# Patient Record
Sex: MALE | Race: WHITE | NOT HISPANIC OR LATINO | Employment: OTHER | URBAN - METROPOLITAN AREA
[De-identification: names, ages, dates, MRNs, and addresses within clinical notes are randomized per-mention and may not be internally consistent; named-entity substitution may affect disease eponyms.]

---

## 2022-03-02 ENCOUNTER — APPOINTMENT (OUTPATIENT)
Dept: RADIOLOGY | Facility: MEDICAL CENTER | Age: 77
DRG: 419 | End: 2022-03-02
Attending: EMERGENCY MEDICINE
Payer: MEDICARE

## 2022-03-02 ENCOUNTER — APPOINTMENT (OUTPATIENT)
Dept: RADIOLOGY | Facility: MEDICAL CENTER | Age: 77
DRG: 419 | End: 2022-03-02
Attending: STUDENT IN AN ORGANIZED HEALTH CARE EDUCATION/TRAINING PROGRAM
Payer: MEDICARE

## 2022-03-02 ENCOUNTER — APPOINTMENT (OUTPATIENT)
Dept: CARDIOLOGY | Facility: MEDICAL CENTER | Age: 77
DRG: 419 | End: 2022-03-02
Attending: STUDENT IN AN ORGANIZED HEALTH CARE EDUCATION/TRAINING PROGRAM
Payer: MEDICARE

## 2022-03-02 ENCOUNTER — HOSPITAL ENCOUNTER (INPATIENT)
Facility: MEDICAL CENTER | Age: 77
LOS: 1 days | DRG: 419 | End: 2022-03-06
Attending: EMERGENCY MEDICINE | Admitting: STUDENT IN AN ORGANIZED HEALTH CARE EDUCATION/TRAINING PROGRAM
Payer: MEDICARE

## 2022-03-02 DIAGNOSIS — R07.89 CHEST PRESSURE: ICD-10-CM

## 2022-03-02 DIAGNOSIS — K80.20 SYMPTOMATIC CHOLELITHIASIS: ICD-10-CM

## 2022-03-02 DIAGNOSIS — I10 PRIMARY HYPERTENSION: ICD-10-CM

## 2022-03-02 DIAGNOSIS — R00.1 BRADYCARDIA: ICD-10-CM

## 2022-03-02 DIAGNOSIS — R10.13 EPIGASTRIC ABDOMINAL PAIN: ICD-10-CM

## 2022-03-02 DIAGNOSIS — K80.20 CALCULUS OF GALLBLADDER WITHOUT CHOLECYSTITIS WITHOUT OBSTRUCTION: ICD-10-CM

## 2022-03-02 DIAGNOSIS — R79.89 ELEVATED LFTS: ICD-10-CM

## 2022-03-02 PROBLEM — R07.9 CHEST PAIN: Status: ACTIVE | Noted: 2022-03-02

## 2022-03-02 PROBLEM — R74.01 TRANSAMINITIS: Status: ACTIVE | Noted: 2022-03-02

## 2022-03-02 LAB
ALBUMIN SERPL BCP-MCNC: 4.3 G/DL (ref 3.2–4.9)
ALBUMIN/GLOB SERPL: 1.7 G/DL
ALP SERPL-CCNC: 154 U/L (ref 30–99)
ALT SERPL-CCNC: 335 U/L (ref 2–50)
ANION GAP SERPL CALC-SCNC: 15 MMOL/L (ref 7–16)
AST SERPL-CCNC: 599 U/L (ref 12–45)
BASOPHILS # BLD AUTO: 0.5 % (ref 0–1.8)
BASOPHILS # BLD: 0.03 K/UL (ref 0–0.12)
BILIRUB SERPL-MCNC: 2.6 MG/DL (ref 0.1–1.5)
BUN SERPL-MCNC: 17 MG/DL (ref 8–22)
CALCIUM SERPL-MCNC: 9.2 MG/DL (ref 8.5–10.5)
CHLORIDE SERPL-SCNC: 105 MMOL/L (ref 96–112)
CO2 SERPL-SCNC: 21 MMOL/L (ref 20–33)
CREAT SERPL-MCNC: 0.62 MG/DL (ref 0.5–1.4)
EKG IMPRESSION: NORMAL
EOSINOPHIL # BLD AUTO: 0.02 K/UL (ref 0–0.51)
EOSINOPHIL NFR BLD: 0.3 % (ref 0–6.9)
ERYTHROCYTE [DISTWIDTH] IN BLOOD BY AUTOMATED COUNT: 38.2 FL (ref 35.9–50)
GLOBULIN SER CALC-MCNC: 2.5 G/DL (ref 1.9–3.5)
GLUCOSE SERPL-MCNC: 110 MG/DL (ref 65–99)
HCT VFR BLD AUTO: 38 % (ref 42–52)
HGB BLD-MCNC: 13.9 G/DL (ref 14–18)
IMM GRANULOCYTES # BLD AUTO: 0.02 K/UL (ref 0–0.11)
IMM GRANULOCYTES NFR BLD AUTO: 0.3 % (ref 0–0.9)
LV EJECT FRACT  99904: 60
LV EJECT FRACT MOD 2C 99903: 56.57
LV EJECT FRACT MOD 4C 99902: 69.45
LV EJECT FRACT MOD BP 99901: 66.41
LYMPHOCYTES # BLD AUTO: 1.19 K/UL (ref 1–4.8)
LYMPHOCYTES NFR BLD: 19 % (ref 22–41)
MAGNESIUM SERPL-MCNC: 2.1 MG/DL (ref 1.5–2.5)
MCH RBC QN AUTO: 32 PG (ref 27–33)
MCHC RBC AUTO-ENTMCNC: 36.6 G/DL (ref 33.7–35.3)
MCV RBC AUTO: 87.6 FL (ref 81.4–97.8)
MONOCYTES # BLD AUTO: 0.53 K/UL (ref 0–0.85)
MONOCYTES NFR BLD AUTO: 8.5 % (ref 0–13.4)
NEUTROPHILS # BLD AUTO: 4.47 K/UL (ref 1.82–7.42)
NEUTROPHILS NFR BLD: 71.4 % (ref 44–72)
NRBC # BLD AUTO: 0 K/UL
NRBC BLD-RTO: 0 /100 WBC
PLATELET # BLD AUTO: 143 K/UL (ref 164–446)
PMV BLD AUTO: 11 FL (ref 9–12.9)
POTASSIUM SERPL-SCNC: 3.7 MMOL/L (ref 3.6–5.5)
PROT SERPL-MCNC: 6.8 G/DL (ref 6–8.2)
RBC # BLD AUTO: 4.34 M/UL (ref 4.7–6.1)
SODIUM SERPL-SCNC: 141 MMOL/L (ref 135–145)
TROPONIN T SERPL-MCNC: 8 NG/L (ref 6–19)
TROPONIN T SERPL-MCNC: 8 NG/L (ref 6–19)
TSH SERPL DL<=0.005 MIU/L-ACNC: 1.15 UIU/ML (ref 0.38–5.33)
WBC # BLD AUTO: 6.3 K/UL (ref 4.8–10.8)

## 2022-03-02 PROCEDURE — 93005 ELECTROCARDIOGRAM TRACING: CPT | Performed by: EMERGENCY MEDICINE

## 2022-03-02 PROCEDURE — 85025 COMPLETE CBC W/AUTO DIFF WBC: CPT

## 2022-03-02 PROCEDURE — 700102 HCHG RX REV CODE 250 W/ 637 OVERRIDE(OP): Performed by: INTERNAL MEDICINE

## 2022-03-02 PROCEDURE — 76705 ECHO EXAM OF ABDOMEN: CPT

## 2022-03-02 PROCEDURE — 96374 THER/PROPH/DIAG INJ IV PUSH: CPT

## 2022-03-02 PROCEDURE — 84484 ASSAY OF TROPONIN QUANT: CPT

## 2022-03-02 PROCEDURE — 80053 COMPREHEN METABOLIC PANEL: CPT

## 2022-03-02 PROCEDURE — G0378 HOSPITAL OBSERVATION PER HR: HCPCS

## 2022-03-02 PROCEDURE — A9270 NON-COVERED ITEM OR SERVICE: HCPCS | Performed by: STUDENT IN AN ORGANIZED HEALTH CARE EDUCATION/TRAINING PROGRAM

## 2022-03-02 PROCEDURE — 96375 TX/PRO/DX INJ NEW DRUG ADDON: CPT

## 2022-03-02 PROCEDURE — 700102 HCHG RX REV CODE 250 W/ 637 OVERRIDE(OP): Performed by: STUDENT IN AN ORGANIZED HEALTH CARE EDUCATION/TRAINING PROGRAM

## 2022-03-02 PROCEDURE — 99285 EMERGENCY DEPT VISIT HI MDM: CPT

## 2022-03-02 PROCEDURE — 94760 N-INVAS EAR/PLS OXIMETRY 1: CPT

## 2022-03-02 PROCEDURE — 71045 X-RAY EXAM CHEST 1 VIEW: CPT

## 2022-03-02 PROCEDURE — 36415 COLL VENOUS BLD VENIPUNCTURE: CPT

## 2022-03-02 PROCEDURE — 93306 TTE W/DOPPLER COMPLETE: CPT

## 2022-03-02 PROCEDURE — 84443 ASSAY THYROID STIM HORMONE: CPT

## 2022-03-02 PROCEDURE — 83735 ASSAY OF MAGNESIUM: CPT

## 2022-03-02 PROCEDURE — 93005 ELECTROCARDIOGRAM TRACING: CPT

## 2022-03-02 PROCEDURE — A9270 NON-COVERED ITEM OR SERVICE: HCPCS | Performed by: HOSPITALIST

## 2022-03-02 PROCEDURE — 700102 HCHG RX REV CODE 250 W/ 637 OVERRIDE(OP): Performed by: HOSPITALIST

## 2022-03-02 PROCEDURE — 700111 HCHG RX REV CODE 636 W/ 250 OVERRIDE (IP): Performed by: EMERGENCY MEDICINE

## 2022-03-02 PROCEDURE — 700105 HCHG RX REV CODE 258: Performed by: EMERGENCY MEDICINE

## 2022-03-02 PROCEDURE — 96372 THER/PROPH/DIAG INJ SC/IM: CPT

## 2022-03-02 PROCEDURE — 99220 PR INITIAL OBSERVATION CARE,LEVL III: CPT | Performed by: STUDENT IN AN ORGANIZED HEALTH CARE EDUCATION/TRAINING PROGRAM

## 2022-03-02 PROCEDURE — A9270 NON-COVERED ITEM OR SERVICE: HCPCS | Performed by: INTERNAL MEDICINE

## 2022-03-02 PROCEDURE — 700111 HCHG RX REV CODE 636 W/ 250 OVERRIDE (IP): Performed by: STUDENT IN AN ORGANIZED HEALTH CARE EDUCATION/TRAINING PROGRAM

## 2022-03-02 PROCEDURE — 93306 TTE W/DOPPLER COMPLETE: CPT | Mod: 26 | Performed by: INTERNAL MEDICINE

## 2022-03-02 RX ORDER — MORPHINE SULFATE 4 MG/ML
2 INJECTION INTRAVENOUS ONCE
Status: COMPLETED | OUTPATIENT
Start: 2022-03-02 | End: 2022-03-02

## 2022-03-02 RX ORDER — TAMSULOSIN HYDROCHLORIDE 0.4 MG/1
0.4 CAPSULE ORAL DAILY
COMMUNITY
Start: 2022-01-10 | End: 2022-07-09

## 2022-03-02 RX ORDER — SIMVASTATIN 40 MG
40 TABLET ORAL NIGHTLY
Status: DISCONTINUED | OUTPATIENT
Start: 2022-03-02 | End: 2022-03-02

## 2022-03-02 RX ORDER — HYDRALAZINE HYDROCHLORIDE 20 MG/ML
10 INJECTION INTRAMUSCULAR; INTRAVENOUS EVERY 6 HOURS PRN
Status: DISCONTINUED | OUTPATIENT
Start: 2022-03-02 | End: 2022-03-06 | Stop reason: HOSPADM

## 2022-03-02 RX ORDER — ACETAMINOPHEN 500 MG
500-1000 TABLET ORAL EVERY 6 HOURS PRN
Status: ON HOLD | COMMUNITY
End: 2022-03-06

## 2022-03-02 RX ORDER — LISINOPRIL 10 MG/1
1 TABLET ORAL DAILY
COMMUNITY
Start: 2022-01-10 | End: 2022-07-09

## 2022-03-02 RX ORDER — SODIUM CHLORIDE 9 MG/ML
INJECTION, SOLUTION INTRAVENOUS CONTINUOUS
Status: DISCONTINUED | OUTPATIENT
Start: 2022-03-02 | End: 2022-03-06 | Stop reason: HOSPADM

## 2022-03-02 RX ORDER — TAMSULOSIN HYDROCHLORIDE 0.4 MG/1
CAPSULE ORAL
Status: ACTIVE
Start: 2022-03-02 | End: 2022-03-03

## 2022-03-02 RX ORDER — ONDANSETRON 2 MG/ML
4 INJECTION INTRAMUSCULAR; INTRAVENOUS ONCE
Status: COMPLETED | OUTPATIENT
Start: 2022-03-02 | End: 2022-03-02

## 2022-03-02 RX ORDER — TAMSULOSIN HYDROCHLORIDE 0.4 MG/1
0.4 CAPSULE ORAL DAILY
Status: DISCONTINUED | OUTPATIENT
Start: 2022-03-02 | End: 2022-03-06 | Stop reason: HOSPADM

## 2022-03-02 RX ORDER — OXYCODONE HYDROCHLORIDE 5 MG/1
5 TABLET ORAL EVERY 4 HOURS PRN
Status: DISCONTINUED | OUTPATIENT
Start: 2022-03-02 | End: 2022-03-06 | Stop reason: HOSPADM

## 2022-03-02 RX ORDER — M-VIT,TX,IRON,MINS/CALC/FOLIC 27MG-0.4MG
1 TABLET ORAL DAILY
COMMUNITY

## 2022-03-02 RX ORDER — LISINOPRIL 10 MG/1
10 TABLET ORAL EVERY EVENING
Status: DISCONTINUED | OUTPATIENT
Start: 2022-03-02 | End: 2022-03-06 | Stop reason: HOSPADM

## 2022-03-02 RX ORDER — FEXOFENADINE HCL 60 MG/1
60 TABLET, FILM COATED ORAL DAILY
Status: ON HOLD | COMMUNITY
End: 2022-03-06

## 2022-03-02 RX ORDER — ACETAMINOPHEN 650 MG/1
650 SUPPOSITORY RECTAL EVERY 4 HOURS PRN
Status: DISCONTINUED | OUTPATIENT
Start: 2022-03-02 | End: 2022-03-06 | Stop reason: HOSPADM

## 2022-03-02 RX ORDER — ASPIRIN 81 MG/1
81 TABLET ORAL DAILY
COMMUNITY

## 2022-03-02 RX ORDER — ACETAMINOPHEN 325 MG/1
650 TABLET ORAL EVERY 4 HOURS PRN
Status: DISCONTINUED | OUTPATIENT
Start: 2022-03-02 | End: 2022-03-06 | Stop reason: HOSPADM

## 2022-03-02 RX ORDER — SIMVASTATIN 40 MG
40 TABLET ORAL NIGHTLY
COMMUNITY

## 2022-03-02 RX ADMIN — ENOXAPARIN SODIUM 40 MG: 40 INJECTION SUBCUTANEOUS at 06:36

## 2022-03-02 RX ADMIN — MORPHINE SULFATE 2 MG: 4 INJECTION INTRAVENOUS at 02:16

## 2022-03-02 RX ADMIN — SODIUM CHLORIDE: 9 INJECTION, SOLUTION INTRAVENOUS at 02:16

## 2022-03-02 RX ADMIN — TAMSULOSIN HYDROCHLORIDE 0.4 MG: 0.4 CAPSULE ORAL at 17:04

## 2022-03-02 RX ADMIN — ONDANSETRON 4 MG: 2 INJECTION INTRAMUSCULAR; INTRAVENOUS at 02:16

## 2022-03-02 RX ADMIN — ACETAMINOPHEN 650 MG: 325 TABLET, FILM COATED ORAL at 17:05

## 2022-03-02 RX ADMIN — SODIUM CHLORIDE: 9 INJECTION, SOLUTION INTRAVENOUS at 19:28

## 2022-03-02 RX ADMIN — ASPIRIN 81 MG: 81 TABLET, COATED ORAL at 17:05

## 2022-03-02 RX ADMIN — SODIUM CHLORIDE: 9 INJECTION, SOLUTION INTRAVENOUS at 11:17

## 2022-03-02 RX ADMIN — LISINOPRIL 10 MG: 10 TABLET ORAL at 19:25

## 2022-03-02 ASSESSMENT — ENCOUNTER SYMPTOMS
FEVER: 0
VOMITING: 0
DOUBLE VISION: 0
HEMOPTYSIS: 0
PALPITATIONS: 0
CHILLS: 0
BRUISES/BLEEDS EASILY: 0
NECK PAIN: 0
MYALGIAS: 0
HEADACHES: 0
BLURRED VISION: 0
HEARTBURN: 0
COUGH: 0
NAUSEA: 0
DEPRESSION: 0
ABDOMINAL PAIN: 1
TINGLING: 0
DIZZINESS: 0

## 2022-03-02 ASSESSMENT — PAIN DESCRIPTION - PAIN TYPE: TYPE: ACUTE PAIN

## 2022-03-02 NOTE — CARE PLAN
Patient arrives to walk in clinic with complaint of right shoulder pain that is exacerbated with movement. . Onset last night. Denies known injury but did carry firewood into house yesterday. Applied ice. Denies latex allergy. Allergies reviewed. PCP per banner. Medications verified and up to date. Rx up to date. The patient is Watcher - Medium risk of patient condition declining or worsening           Problem: Knowledge Deficit - Standard  Goal: Patient and family/care givers will demonstrate understanding of plan of care, disease process/condition, diagnostic tests and medications  Outcome: Progressing

## 2022-03-02 NOTE — ED TRIAGE NOTES
Ton Christine  77 y.o. male  Chief Complaint   Patient presents with   • Chest Pain     Chest pain started at 1500 yesterday. He took some tums because he thought he was having indigestion without relief. Describes pain as an uncomfortable pressure. +Minor abdominal pain.      Patient BIB EMS for the above. He has a similar pain over the weekend but thought it was indigestion and wasn't seen then. In triage, pt's BP on left: 135/71 and on right: 146/86. No cardiac history. PIV placed with EMS. EKG complete.     Vitals:    03/02/22 0105   BP: (!) 163/83   Pulse: 63   Resp: 19   Temp: 36.6 °C (97.9 °F)   SpO2: 94%       Triage process explained to patient, apologized for wait time, and returned to lobby.  Pt informed to notify staff of any change in condition.

## 2022-03-02 NOTE — ASSESSMENT & PLAN NOTE
Telemetry monitoring  TSH 1.150  Echo demonstrated normal left ventricular size, wall thickness, systolic, and diastolic function. EF 60%. Normal right ventricular size and systolic function. Normal left atrial size. Mild mitral annular calcification and calcification mitral apparatus.  Cardiology consulted, no further inpatient cardiac work-up is indicated at this time.

## 2022-03-02 NOTE — ED PROVIDER NOTES
ED Provider Note     Scribed for Danae Gautam D.O. by Gian De Anda. 3/2/2022, 1:41 AM.     Primary care provider: No primary care provider noted.  Means of arrival: EMS         History obtained from: Patient  History limited by: None    CHIEF COMPLAINT  Chief Complaint   Patient presents with   • Chest Pain     Chest pain started at 1500 yesterday. He took some tums because he thought he was having indigestion without relief. Describes pain as an uncomfortable pressure. +Minor abdominal pain.        HPI  Ton Christine is a 77 y.o. male who presents to the emergency Department via EMS for evaluation of acute chest pain. This started around 3 pm yesterday afternoon. He was in the recliner watching TV when he developed pressure in his chest. At the time he was mildly short of breath and diaphoretic. About a week ago he had fever, vomiting, and diarrhea but not over the last several days. He has not been tested for COVID.  He is fully vaccinated for Covid including booster.  He has a history of hypertension and TIA, and takes Lisinopril, 81 mg of ASA, and Flomax for an enlarged prostate.     REVIEW OF SYSTEMS  Pertinent positives include chest pain, shortness of breath, and diaphoresis. Pertinent negatives include no fever, vomiting, and diarrhea.   See HPI for further details. All other systems are negative.    PAST MEDICAL HISTORY  Past Medical History:   Diagnosis Date   • Hypertension    Benign prostatic hypertrophy    FAMILY HISTORY  History reviewed. No pertinent family history.    SOCIAL HISTORY  Social History     Tobacco Use   • Smoking status: Never Smoker   • Smokeless tobacco: Never Used   Vaping Use   • Vaping Use: Never used   Substance Use Topics   • Alcohol use: Not Currently   • Drug use: Never      Social History     Substance and Sexual Activity   Drug Use Never       SURGICAL HISTORY  History reviewed. No pertinent surgical history.    CURRENT MEDICATIONS  No current outpatient  "medications    ALLERGIES  Allergies   Allergen Reactions   • Ampicillin Unspecified     Childhood allergy/ unkn reaction       PHYSICAL EXAM  VITAL SIGNS: BP (!) 163/83   Pulse 63   Temp 36.6 °C (97.9 °F) (Temporal)   Resp 19   Ht 1.727 m (5' 8\")   Wt 71.7 kg (158 lb)   SpO2 94%   BMI 24.02 kg/m²     Constitutional: Patient is well developed, well nourished. Non-toxic appearing.  Mild distress.  HENT: Normocephalic, atraumatic.  Oral mucosa moist.  Eyes: PERRL, EOMI, Conjunctiva without erythema or exudates.   Cardiovascular: Normal heart rate and Regular rhythm. No murmur,  Thorax & Lungs: Clear and equal breath sounds with good excursion. No respiratory distress, no rhonchi, wheezing or rales.  No palpable chest wall tenderness.  Abdomen: Bowel sounds normal in all four quadrants. Soft,nontender, no rebound , guarding, palpable masses.   Skin: Warm, Dry, No erythema, No rashes.   Back: No cervical, thoracic, or lumbosacral tenderness. No CVA tenderness.   Extremities: Peripheral pulses 4/4 No edema, No tenderness  Musculoskeletal: Normal range of motion in all major joints. No tenderness to palpation or major deformities noted.   Neurologic: Alert & oriented x 3, Normal motor function, Normal sensory function  Psychiatric: Affect normal, Judgment normal, Mood normal.     DIAGNOSTICS/PROCEDURES    LABS  Results for orders placed or performed during the hospital encounter of 03/02/22   CBC with Differential   Result Value Ref Range    WBC 6.3 4.8 - 10.8 K/uL    RBC 4.34 (L) 4.70 - 6.10 M/uL    Hemoglobin 13.9 (L) 14.0 - 18.0 g/dL    Hematocrit 38.0 (L) 42.0 - 52.0 %    MCV 87.6 81.4 - 97.8 fL    MCH 32.0 27.0 - 33.0 pg    MCHC 36.6 (H) 33.7 - 35.3 g/dL    RDW 38.2 35.9 - 50.0 fL    Platelet Count 143 (L) 164 - 446 K/uL    MPV 11.0 9.0 - 12.9 fL    Neutrophils-Polys 71.40 44.00 - 72.00 %    Lymphocytes 19.00 (L) 22.00 - 41.00 %    Monocytes 8.50 0.00 - 13.40 %    Eosinophils 0.30 0.00 - 6.90 %    Basophils " 0.50 0.00 - 1.80 %    Immature Granulocytes 0.30 0.00 - 0.90 %    Nucleated RBC 0.00 /100 WBC    Neutrophils (Absolute) 4.47 1.82 - 7.42 K/uL    Lymphs (Absolute) 1.19 1.00 - 4.80 K/uL    Monos (Absolute) 0.53 0.00 - 0.85 K/uL    Eos (Absolute) 0.02 0.00 - 0.51 K/uL    Baso (Absolute) 0.03 0.00 - 0.12 K/uL    Immature Granulocytes (abs) 0.02 0.00 - 0.11 K/uL    NRBC (Absolute) 0.00 K/uL   Complete Metabolic Panel (CMP)   Result Value Ref Range    Sodium 141 135 - 145 mmol/L    Potassium 3.7 3.6 - 5.5 mmol/L    Chloride 105 96 - 112 mmol/L    Co2 21 20 - 33 mmol/L    Anion Gap 15.0 7.0 - 16.0    Glucose 110 (H) 65 - 99 mg/dL    Bun 17 8 - 22 mg/dL    Creatinine 0.62 0.50 - 1.40 mg/dL    Calcium 9.2 8.5 - 10.5 mg/dL    AST(SGOT) 599 (H) 12 - 45 U/L    ALT(SGPT) 335 (H) 2 - 50 U/L    Alkaline Phosphatase 154 (H) 30 - 99 U/L    Total Bilirubin 2.6 (H) 0.1 - 1.5 mg/dL    Albumin 4.3 3.2 - 4.9 g/dL    Total Protein 6.8 6.0 - 8.2 g/dL    Globulin 2.5 1.9 - 3.5 g/dL    A-G Ratio 1.7 g/dL   Troponin   Result Value Ref Range    Troponin T 8 6 - 19 ng/L   ESTIMATED GFR   Result Value Ref Range    GFR If African American >60 >60 mL/min/1.73 m 2    GFR If Non African American >60 >60 mL/min/1.73 m 2   EKG   Result Value Ref Range    Report       [facility] Emergency Dept.    Test Date:  2022  Pt Name:    MARTIR FAYE               Department: ER  MRN:        [id number]                      Room:  Gender:     Male                         Technician: [id number]  :        [id number]                   Requested By:ER TRIAGE PROTOCOL  Order #:    [id number]                    Reading MD:    Measurements  Intervals                                Axis  Rate:       55                           P:          56  MS:         152                          QRS:        13  QRSD:       90                           T:          54  QT:         440  QTc:        421    Interpretive Statements  SINUS BRADYCARDIA  No previous ECG  available for comparison       Labs reviewed by me    EKG  12 lead EKG interpreted by myself, see above.    RADIOLOGY/PROCEDURES  US-RUQ   Final Result      Cholelithiasis and sludge without positive sonographic Esparza's sign or gallbladder wall thickening      Mild right pelviectasis without james hydronephrosis.      DX-CHEST-PORTABLE (1 VIEW)   Final Result      No radiographic evidence of consolidation      Mild aortic ectasia      EC-ECHOCARDIOGRAM COMPLETE W/O CONT    (Results Pending)     Results and radiologist interpretation reviewed by me.     COURSE & MEDICAL DECISION MAKING  Pertinent Labs & Imaging studies reviewed. (See chart for details)    1:41 AM - Patient seen and evaluated at bedside. Ordered for EKG, DX-chest, CBC with differential, CMP, and Troponin to evaluate. Patient will be treated with Morphine, Zofran, and IV fluids for his symptoms. Differential diagnoses include, but are not limited to, CAD, gastroenteritis, COVID    Patient's cardiac enzymes were unremarkable including a CBC.  His CMP was revealed markedly elevated liver enzymes or right upper quadrant abdominal ultrasound was performed.    2:20 AM - Ordered US-RUQ due to elevated liver function tests. Updated patient on plan of care.   Ultrasound revealed multiple gallstones as well as biliary sludge.  There is no signs of acute cholecystitis or choledocholithiasis.  He is remained stable and pain-free while in the ER.  The morphine and Zofran helped his discomfort remarkably.  He still remains bradycardic and this will need to be evaluated.  He is currently stable with plans for admission to the hospital under med telemetry with surgical evaluation and treatment for his cholelithiasis.  He is in guarded condition  I spoke with Dr. Stoner and the patient was admitted under the hospitalist service.      FINAL IMPRESSION  1. Epigastric abdominal pain    2. Chest pressure    3. Bradycardia    4. Elevated LFTs    5. Primary hypertension     6. Calculus of gallbladder without cholecystitis without obstruction         Gian MARQUEZ (Scribolivia), am scribing for, and in the presence of, Danae Gautam D.O..    Electronically signed by: Gian De Anda (Goyo), 3/2/2022    IDanae D.O. personally performed the services described in this documentation, as scribed by Gian De Anda in my presence, and it is both accurate and complete.    The note accurately reflects work and decisions made by me.  Danae Gautam D.O.  3/2/2022  6:18 AM

## 2022-03-02 NOTE — ED NOTES
Report called to JULY Christopher. Pt taken up to T8 on [employer] with all belongings on [employer] monitor.

## 2022-03-02 NOTE — PROGRESS NOTES
Pt arrived to unit via gurney at 0800. Pt oriented to room, unit, and plan of care. Tele-monitor placed and monitor room notified. All questions answered at this time. Call light within reach; fall precautions in place.

## 2022-03-02 NOTE — PROGRESS NOTES
"Hospital Medicine Daily Progress Note    Date of Service  3/2/2022    Chief Complaint  Ton Christine is a 77 y.o. male admitted 3/2/2022 with chest pain and transaminitis     Hospital Course  \"Ton Christine is a 77 y.o. male past medical history of hypertension, TIA who presented 3/2/2022 with chest pain that started on Tuesday while sitting in recliner.  He reports the pain was 8 out of 10 intensity, nonradiating, not relieved with Tums, exacerbated with exertion.  He has never experienced this in the past.  Patient reports having a stress test 3 years ago which was normal.  He also reports mild abdominal pain, 6 out of 10 intensity, dull and crampy.  He is not on any blood thinners.  He also reports that 2 days ago he started looking at his iWatch and noticed that his heart rate was ranging from 40s to 60s which prompted him to come to the hospital.  He is vaccinated for COVID-19.  Denies any fever, chills, nausea or vomiting.     In the ER, he was noted to have elevated LFTs.  Sonogram showed cholelithiasis without any evidence of cholecystitis.  EKG done showed sinus bradycardia with no significant ST-T wave changes of ischemia.  He will be hospitalized on telemetry floor for echocardiogram and a general surgery consultation.\"    Interval Problem Update  Pending US  Trop negative x 2    Consultants/Specialty  general surgery    Code Status  Full Code    Disposition  Patient is not medically cleared for discharge.   Anticipate discharge to tbd.  I have placed the appropriate orders for post-discharge needs.    Review of Systems  ROS     Physical Exam  Temp:  [36.4 °C (97.5 °F)-36.6 °C (97.9 °F)] 36.4 °C (97.5 °F)  Pulse:  [47-63] 51  Resp:  [11-27] 16  BP: (131-165)/(75-96) 141/77  SpO2:  [90 %-97 %] 97 %    Physical Exam    Fluids  No intake or output data in the 24 hours ending 03/02/22 0839    Laboratory  Recent Labs     03/02/22  0128   WBC 6.3   RBC 4.34*   HEMOGLOBIN 13.9*   HEMATOCRIT 38.0* "   MCV 87.6   MCH 32.0   MCHC 36.6*   RDW 38.2   PLATELETCT 143*   MPV 11.0     Recent Labs     03/02/22  0128   SODIUM 141   POTASSIUM 3.7   CHLORIDE 105   CO2 21   GLUCOSE 110*   BUN 17   CREATININE 0.62   CALCIUM 9.2                   Imaging  US-RUQ   Final Result      Cholelithiasis and sludge without positive sonographic Esparza's sign or gallbladder wall thickening      Mild right pelviectasis without james hydronephrosis.      DX-CHEST-PORTABLE (1 VIEW)   Final Result      No radiographic evidence of consolidation      Mild aortic ectasia      EC-ECHOCARDIOGRAM COMPLETE W/O CONT    (Results Pending)   NM-CARDIAC STRESS TEST    (Results Pending)        Assessment/Plan  * Bradycardia- (present on admission)  Assessment & Plan  Telemetry monitoring  TSH with reflex T4  Echocardiogram transthoracic eval for LVF and wall motion abnormalities    Hypertension  Assessment & Plan  IV hydralazine PRN     Transaminitis  Assessment & Plan  Likely secondary to cholelithiasis  General surgical consult in a.m.  Trend   IVF    Chest pain  Assessment & Plan  Telemetry monitoring  Initial troponin negative  EKG showing sinus bradycardia with no significant ST-T wave changes  Echocardiogram (evaluate for LVF and wall motion abnormalities  Continue with aspirin 81 mg daily  Check TSH         VTE prophylaxis: enoxaparin ppx    I have performed a physical exam and reviewed and updated ROS and Plan today (3/2/2022). In review of yesterday's note (3/1/2022), there are no changes except as documented above.

## 2022-03-02 NOTE — H&P
Hospital Medicine History & Physical Note    Date of Service  3/2/2022    Primary Care Physician  No primary care provider on file.    Consultants  none    Specialist Names: none     Code Status  Full Code    Chief Complaint  Chief Complaint   Patient presents with   • Chest Pain     Chest pain started at 1500 yesterday. He took some tums because he thought he was having indigestion without relief. Describes pain as an uncomfortable pressure. +Minor abdominal pain.        History of Presenting Illness  Ton Christine is a 77 y.o. male past medical history of hypertension, TIA who presented 3/2/2022 with chest pain that started on Tuesday while sitting in recliner.  He reports the pain was 8 out of 10 intensity, nonradiating, not relieved with Tums, exacerbated with exertion.  He has never experienced this in the past.  Patient reports having a stress test 3 years ago which was normal.  He also reports mild abdominal pain, 6 out of 10 intensity, dull and crampy.  He is not on any blood thinners.  He also reports that 2 days ago he started looking at his iWatch and noticed that his heart rate was ranging from 40s to 60s which prompted him to come to the hospital.  He is vaccinated for COVID-19.  Denies any fever, chills, nausea or vomiting.    In the ER, he was noted to have elevated LFTs.  Sonogram showed cholelithiasis without any evidence of cholecystitis.  EKG done showed sinus bradycardia with no significant ST-T wave changes of ischemia.  He will be hospitalized on telemetry floor for echocardiogram and a general surgery consultation.    I discussed the plan of care with patient.    Review of Systems  Review of Systems   Constitutional: Negative for chills and fever.   HENT: Negative for hearing loss and tinnitus.    Eyes: Negative for blurred vision and double vision.   Respiratory: Negative for cough and hemoptysis.    Cardiovascular: Positive for chest pain. Negative for palpitations.    Gastrointestinal: Positive for abdominal pain. Negative for heartburn, nausea and vomiting.   Genitourinary: Negative for dysuria and urgency.   Musculoskeletal: Negative for myalgias and neck pain.   Skin: Negative for itching and rash.   Neurological: Negative for dizziness, tingling and headaches.   Endo/Heme/Allergies: Does not bruise/bleed easily.   Psychiatric/Behavioral: Negative for depression and suicidal ideas.       Past Medical History   has a past medical history of Hypertension.    Surgical History  Reviewed and not pertinent    Family History  Reviewed not pertinent  Family history reviewed with patient. There is no family history that is pertinent to the chief complaint.     Social History   reports that he has never smoked. He has never used smokeless tobacco. He reports previous alcohol use. He reports that he does not use drugs.    Allergies  Allergies   Allergen Reactions   • Ampicillin Unspecified     Childhood allergy/ unkn reaction       Medications  Prior to Admission Medications   Prescriptions Last Dose Informant Patient Reported? Taking?   acetaminophen (TYLENOL) 500 MG Tab 3/1/2022 at 2130 Patient Yes Yes   Sig: Take 500-1,000 mg by mouth every 6 hours as needed for Mild Pain.   aspirin 81 MG EC tablet 3/1/2022 at 2130 Patient Yes No   Sig: Take 81 mg by mouth every day.   fexofenadine (ALLEGRA) 60 MG Tab 3/1/2022 at AM Patient Yes Yes   Sig: Take 60 mg by mouth every day.   lisinopril (PRINIVIL) 10 MG Tab 3/1/2022 at 2130 Patient Yes Yes   Sig: Take 1 Tablet by mouth every day.   simvastatin (ZOCOR) 40 MG Tab 3/1/2022 at 2130 Patient Yes Yes   Sig: Take 40 mg by mouth every evening.   tamsulosin (FLOMAX) 0.4 MG capsule 3/1/2022 at 2130 Patient Yes Yes   Sig: Take 0.4 mg by mouth every day.   therapeutic multivitamin-minerals (THERAGRAN-M) Tab 3/1/2022 at 2130 Patient Yes Yes   Sig: Take 1 Tablet by mouth every day. Centrum Mens      Facility-Administered Medications: None        Physical Exam  Temp:  [36.6 °C (97.9 °F)] 36.6 °C (97.9 °F)  Pulse:  [47-63] 48  Resp:  [11-33] 13  BP: (131-165)/(76-96) 146/78  SpO2:  [92 %-97 %] 95 %  Blood Pressure : 146/78   Temperature: 36.6 °C (97.9 °F)   Pulse: (!) 48   Respiration: 13   Pulse Oximetry: 95 %       Physical Exam  Vitals and nursing note reviewed.   Constitutional:       General: He is not in acute distress.     Appearance: Normal appearance. He is not ill-appearing.   HENT:      Head: Normocephalic and atraumatic.      Right Ear: Tympanic membrane normal.      Left Ear: Tympanic membrane normal.      Nose: Nose normal.      Mouth/Throat:      Mouth: Mucous membranes are moist.      Pharynx: Oropharynx is clear.   Eyes:      General:         Right eye: No discharge.         Left eye: No discharge.      Extraocular Movements: Extraocular movements intact.      Pupils: Pupils are equal, round, and reactive to light.   Cardiovascular:      Rate and Rhythm: Normal rate and regular rhythm.      Pulses: Normal pulses.      Heart sounds: Normal heart sounds.   Pulmonary:      Effort: Pulmonary effort is normal. No respiratory distress.      Breath sounds: Normal breath sounds. No stridor. No wheezing or rhonchi.   Abdominal:      General: Bowel sounds are normal. There is no distension.      Palpations: Abdomen is soft. There is no mass.      Tenderness: There is no abdominal tenderness.      Hernia: No hernia is present.   Musculoskeletal:         General: No swelling, tenderness, deformity or signs of injury. Normal range of motion.      Cervical back: Neck supple. No rigidity or tenderness.   Skin:     General: Skin is warm.      Capillary Refill: Capillary refill takes less than 2 seconds.      Coloration: Skin is not jaundiced or pale.      Findings: No bruising or erythema.   Neurological:      General: No focal deficit present.      Mental Status: He is alert and oriented to person, place, and time. Mental status is at baseline.       Cranial Nerves: No cranial nerve deficit.      Sensory: No sensory deficit.      Motor: No weakness.      Coordination: Coordination normal.   Psychiatric:         Mood and Affect: Mood normal.         Behavior: Behavior normal.         Laboratory:  Recent Labs     03/02/22  0128   WBC 6.3   RBC 4.34*   HEMOGLOBIN 13.9*   HEMATOCRIT 38.0*   MCV 87.6   MCH 32.0   MCHC 36.6*   RDW 38.2   PLATELETCT 143*   MPV 11.0     Recent Labs     03/02/22  0128   SODIUM 141   POTASSIUM 3.7   CHLORIDE 105   CO2 21   GLUCOSE 110*   BUN 17   CREATININE 0.62   CALCIUM 9.2     Recent Labs     03/02/22  0128   ALTSGPT 335*   ASTSGOT 599*   ALKPHOSPHAT 154*   TBILIRUBIN 2.6*   GLUCOSE 110*         No results for input(s): NTPROBNP in the last 72 hours.      Recent Labs     03/02/22  0128   TROPONINT 8       Imaging:  US-RUQ   Final Result      Cholelithiasis and sludge without positive sonographic Esparza's sign or gallbladder wall thickening      Mild right pelviectasis without james hydronephrosis.      DX-CHEST-PORTABLE (1 VIEW)   Final Result      No radiographic evidence of consolidation      Mild aortic ectasia      EC-ECHOCARDIOGRAM COMPLETE W/O CONT    (Results Pending)       X-Ray:  I have personally reviewed the images and compared with prior images.  EKG:  I have personally reviewed the images and compared with prior images.    Assessment/Plan:  I anticipate this patient is appropriate for observation status at this time.    * Bradycardia- (present on admission)  Assessment & Plan  Telemetry monitoring  TSH with reflex T4  Echocardiogram transthoracic eval for LVF and wall motion abnormalities    Hypertension  Assessment & Plan  IV hydralazine PRN     Transaminitis  Assessment & Plan  Likely secondary to cholelithiasis  General surgical consult in a.m.  Trend   IVF    Chest pain  Assessment & Plan  Telemetry monitoring  Initial troponin negative  EKG showing sinus bradycardia with no significant ST-T wave  changes  Echocardiogram (evaluate for LVF and wall motion abnormalities  Continue with aspirin 81 mg daily  Check TSH        VTE prophylaxis: enoxaparin ppx

## 2022-03-02 NOTE — ASSESSMENT & PLAN NOTE
Initial troponin negative  EKG showing sinus bradycardia with no significant ST-T wave changes  Continue with aspirin 81 mg daily  Cardiology recommended no further cardiac work-up, optimize blood pressure.

## 2022-03-03 PROBLEM — I16.0 HYPERTENSIVE URGENCY: Status: ACTIVE | Noted: 2022-03-03

## 2022-03-03 PROBLEM — K80.20 CHOLELITHIASIS: Status: ACTIVE | Noted: 2022-03-03

## 2022-03-03 LAB
ALBUMIN SERPL BCP-MCNC: 3.8 G/DL (ref 3.2–4.9)
ALBUMIN/GLOB SERPL: 2 G/DL
ALP SERPL-CCNC: 151 U/L (ref 30–99)
ALT SERPL-CCNC: 252 U/L (ref 2–50)
ANION GAP SERPL CALC-SCNC: 9 MMOL/L (ref 7–16)
AST SERPL-CCNC: 191 U/L (ref 12–45)
BASOPHILS # BLD AUTO: 0.6 % (ref 0–1.8)
BASOPHILS # BLD: 0.03 K/UL (ref 0–0.12)
BILIRUB SERPL-MCNC: 1.9 MG/DL (ref 0.1–1.5)
BUN SERPL-MCNC: 14 MG/DL (ref 8–22)
CALCIUM SERPL-MCNC: 8.4 MG/DL (ref 8.5–10.5)
CHLORIDE SERPL-SCNC: 109 MMOL/L (ref 96–112)
CO2 SERPL-SCNC: 22 MMOL/L (ref 20–33)
CREAT SERPL-MCNC: 0.74 MG/DL (ref 0.5–1.4)
EOSINOPHIL # BLD AUTO: 0.08 K/UL (ref 0–0.51)
EOSINOPHIL NFR BLD: 1.5 % (ref 0–6.9)
ERYTHROCYTE [DISTWIDTH] IN BLOOD BY AUTOMATED COUNT: 38.9 FL (ref 35.9–50)
GLOBULIN SER CALC-MCNC: 1.9 G/DL (ref 1.9–3.5)
GLUCOSE SERPL-MCNC: 100 MG/DL (ref 65–99)
HCT VFR BLD AUTO: 34.8 % (ref 42–52)
HGB BLD-MCNC: 12.3 G/DL (ref 14–18)
IMM GRANULOCYTES # BLD AUTO: 0.02 K/UL (ref 0–0.11)
IMM GRANULOCYTES NFR BLD AUTO: 0.4 % (ref 0–0.9)
LYMPHOCYTES # BLD AUTO: 1.54 K/UL (ref 1–4.8)
LYMPHOCYTES NFR BLD: 29.3 % (ref 22–41)
MCH RBC QN AUTO: 31.6 PG (ref 27–33)
MCHC RBC AUTO-ENTMCNC: 35.3 G/DL (ref 33.7–35.3)
MCV RBC AUTO: 89.5 FL (ref 81.4–97.8)
MONOCYTES # BLD AUTO: 0.39 K/UL (ref 0–0.85)
MONOCYTES NFR BLD AUTO: 7.4 % (ref 0–13.4)
NEUTROPHILS # BLD AUTO: 3.19 K/UL (ref 1.82–7.42)
NEUTROPHILS NFR BLD: 60.8 % (ref 44–72)
NRBC # BLD AUTO: 0 K/UL
NRBC BLD-RTO: 0 /100 WBC
PLATELET # BLD AUTO: 119 K/UL (ref 164–446)
PMV BLD AUTO: 11 FL (ref 9–12.9)
POTASSIUM SERPL-SCNC: 4 MMOL/L (ref 3.6–5.5)
PROT SERPL-MCNC: 5.7 G/DL (ref 6–8.2)
RBC # BLD AUTO: 3.89 M/UL (ref 4.7–6.1)
SODIUM SERPL-SCNC: 140 MMOL/L (ref 135–145)
WBC # BLD AUTO: 5.3 K/UL (ref 4.8–10.8)

## 2022-03-03 PROCEDURE — 99226 PR SUBSEQUENT OBSERVATION CARE,LEVEL III: CPT | Performed by: STUDENT IN AN ORGANIZED HEALTH CARE EDUCATION/TRAINING PROGRAM

## 2022-03-03 PROCEDURE — 99204 OFFICE O/P NEW MOD 45 MIN: CPT | Performed by: INTERNAL MEDICINE

## 2022-03-03 PROCEDURE — 96375 TX/PRO/DX INJ NEW DRUG ADDON: CPT

## 2022-03-03 PROCEDURE — G0378 HOSPITAL OBSERVATION PER HR: HCPCS

## 2022-03-03 PROCEDURE — 99219 PR INITIAL OBSERVATION CARE,LEVL II: CPT | Performed by: SURGERY

## 2022-03-03 PROCEDURE — 700105 HCHG RX REV CODE 258: Performed by: EMERGENCY MEDICINE

## 2022-03-03 PROCEDURE — A9270 NON-COVERED ITEM OR SERVICE: HCPCS | Performed by: HOSPITALIST

## 2022-03-03 PROCEDURE — 700102 HCHG RX REV CODE 250 W/ 637 OVERRIDE(OP): Performed by: INTERNAL MEDICINE

## 2022-03-03 PROCEDURE — 700102 HCHG RX REV CODE 250 W/ 637 OVERRIDE(OP): Performed by: STUDENT IN AN ORGANIZED HEALTH CARE EDUCATION/TRAINING PROGRAM

## 2022-03-03 PROCEDURE — 36415 COLL VENOUS BLD VENIPUNCTURE: CPT

## 2022-03-03 PROCEDURE — 700102 HCHG RX REV CODE 250 W/ 637 OVERRIDE(OP): Performed by: HOSPITALIST

## 2022-03-03 PROCEDURE — A9270 NON-COVERED ITEM OR SERVICE: HCPCS | Performed by: INTERNAL MEDICINE

## 2022-03-03 PROCEDURE — 85025 COMPLETE CBC W/AUTO DIFF WBC: CPT

## 2022-03-03 PROCEDURE — 700111 HCHG RX REV CODE 636 W/ 250 OVERRIDE (IP): Performed by: STUDENT IN AN ORGANIZED HEALTH CARE EDUCATION/TRAINING PROGRAM

## 2022-03-03 PROCEDURE — A9270 NON-COVERED ITEM OR SERVICE: HCPCS | Performed by: STUDENT IN AN ORGANIZED HEALTH CARE EDUCATION/TRAINING PROGRAM

## 2022-03-03 PROCEDURE — 80053 COMPREHEN METABOLIC PANEL: CPT

## 2022-03-03 RX ADMIN — SODIUM CHLORIDE: 9 INJECTION, SOLUTION INTRAVENOUS at 16:41

## 2022-03-03 RX ADMIN — ACETAMINOPHEN 650 MG: 325 TABLET, FILM COATED ORAL at 11:55

## 2022-03-03 RX ADMIN — ENOXAPARIN SODIUM 40 MG: 40 INJECTION SUBCUTANEOUS at 04:11

## 2022-03-03 RX ADMIN — ACETAMINOPHEN 650 MG: 325 TABLET, FILM COATED ORAL at 17:24

## 2022-03-03 RX ADMIN — SODIUM CHLORIDE: 9 INJECTION, SOLUTION INTRAVENOUS at 09:39

## 2022-03-03 RX ADMIN — ASPIRIN 81 MG: 81 TABLET, COATED ORAL at 17:24

## 2022-03-03 RX ADMIN — ACETAMINOPHEN 650 MG: 325 TABLET, FILM COATED ORAL at 07:49

## 2022-03-03 RX ADMIN — SODIUM CHLORIDE: 9 INJECTION, SOLUTION INTRAVENOUS at 02:48

## 2022-03-03 RX ADMIN — TAMSULOSIN HYDROCHLORIDE 0.4 MG: 0.4 CAPSULE ORAL at 17:24

## 2022-03-03 RX ADMIN — LISINOPRIL 10 MG: 10 TABLET ORAL at 17:24

## 2022-03-03 RX ADMIN — ACETAMINOPHEN 650 MG: 325 TABLET, FILM COATED ORAL at 02:48

## 2022-03-03 RX ADMIN — ACETAMINOPHEN 650 MG: 325 TABLET, FILM COATED ORAL at 21:37

## 2022-03-03 ASSESSMENT — LIFESTYLE VARIABLES
TOTAL SCORE: 0
HOW MANY TIMES IN THE PAST YEAR HAVE YOU HAD 5 OR MORE DRINKS IN A DAY: 0
HAVE PEOPLE ANNOYED YOU BY CRITICIZING YOUR DRINKING: NO
TOTAL SCORE: 0
EVER HAD A DRINK FIRST THING IN THE MORNING TO STEADY YOUR NERVES TO GET RID OF A HANGOVER: NO
TOTAL SCORE: 0
DOES PATIENT WANT TO STOP DRINKING: NO
HAVE YOU EVER FELT YOU SHOULD CUT DOWN ON YOUR DRINKING: NO
EVER FELT BAD OR GUILTY ABOUT YOUR DRINKING: NO
AVERAGE NUMBER OF DAYS PER WEEK YOU HAVE A DRINK CONTAINING ALCOHOL: 0
ALCOHOL_USE: NO
CONSUMPTION TOTAL: NEGATIVE
ON A TYPICAL DAY WHEN YOU DRINK ALCOHOL HOW MANY DRINKS DO YOU HAVE: 0

## 2022-03-03 ASSESSMENT — ENCOUNTER SYMPTOMS
EYES NEGATIVE: 1
NAUSEA: 0
FEVER: 0
SHORTNESS OF BREATH: 0
CHILLS: 0
MYALGIAS: 0
COUGH: 0
FOCAL WEAKNESS: 0
ABDOMINAL PAIN: 1
VOMITING: 0

## 2022-03-03 ASSESSMENT — COGNITIVE AND FUNCTIONAL STATUS - GENERAL
DAILY ACTIVITIY SCORE: 24
MOBILITY SCORE: 24
SUGGESTED CMS G CODE MODIFIER DAILY ACTIVITY: CH
SUGGESTED CMS G CODE MODIFIER MOBILITY: CH

## 2022-03-03 ASSESSMENT — PATIENT HEALTH QUESTIONNAIRE - PHQ9
2. FEELING DOWN, DEPRESSED, IRRITABLE, OR HOPELESS: NOT AT ALL
SUM OF ALL RESPONSES TO PHQ9 QUESTIONS 1 AND 2: 0
1. LITTLE INTEREST OR PLEASURE IN DOING THINGS: NOT AT ALL

## 2022-03-03 ASSESSMENT — PAIN DESCRIPTION - PAIN TYPE: TYPE: ACUTE PAIN

## 2022-03-03 NOTE — PROGRESS NOTES
4 Eyes Skin Assessment Completed by JULY Christopher and Kamilah MCDOWELL.    Head WDL  Ears WDL  Nose WDL  Mouth WDL  Neck WDL  Breast/Chest WDL  Shoulder Blades WDL  Spine WDL  (R) Arm/Elbow/Hand WDL  (L) Arm/Elbow/Hand WDL  Abdomen WDL  Groin WDL  Scrotum/Coccyx/Buttocks WDL  (R) Leg WDL  (L) Leg WDL  (R) Heel/Foot/Toe WDL  (L) Heel/Foot/Toe WDL          Devices In Places Tele Box      Interventions In Place N/A    Possible Skin Injury No    Pictures Uploaded Into Epic N/A  Wound Consult Placed N/A  RN Wound Prevention Protocol Ordered No

## 2022-03-03 NOTE — DISCHARGE PLANNING
Anticipated Discharge Disposition: Home    Action: pt pending medical/cardiology clearance, pt on room air, 6 clicks are 24, no case management needs identified.    Barriers to Discharge: med/cards clearance    Plan: f/u with medical team and pt to discuss dc needs and barriers.

## 2022-03-03 NOTE — PROGRESS NOTES
Hospital Medicine Daily Progress Note    Date of Service  3/3/2022    Chief Complaint  Ton Christine is a 77 y.o. male admitted 3/2/2022 with chest pain    Hospital Course  A 77-year-old man with h/o HTN, TIA presented with chest pain that started on Tuesday while sitting in recliner. He has never experienced this in the past. Patient reports having a stress test 3 years ago which was normal. He also reports that 2 days ago he started looking at his iWatch and noticed that his heart rate was ranging from 40s to 60s which prompted him to come to the hospital.   In the ER, he was noted to have elevated LFTs. Sonogram showed cholelithiasis without any evidence of cholecystitis.    EKG showed sinus bradycardia with no significant ST-T wave changes of ischemia. Trop negative x 2.  Echo demonstrated normal left ventricular size, wall thickness, systolic, and diastolic function. EF 60%. Normal right ventricular size and systolic function. Normal left atrial size. Mild mitral annular calcification and calcification mitral apparatus.      Interval Problem Update  Patient reports mid chest-abdominal pain 2-3/10, chest pain pleuritic, hurts with deep inspiration. Denies nausea, vomiting.  Afebrile, HR 51-61. BP stable.  Platelets 143->119.  LFTs down trending.  TSH 1.150  Cardiology recommended no further cardiac work-up, optimize blood pressure.  General surgery consulted for cholelithiasis, possible stone passage. Surgery planning lap julia tomorrow. NPO midnight.      I have personally seen and examined the patient at bedside. I discussed the plan of care with patient, bedside RN, charge RN,  and pharmacy.    Consultants/Specialty  cardiology and general surgery    Code Status  Full Code    Disposition  Patient is not medically cleared for discharge.   Anticipate discharge to TBD.  I have placed the appropriate orders for post-discharge needs.    Review of Systems  Review of Systems   Constitutional:  Negative for chills, fever and malaise/fatigue.   HENT: Negative.    Eyes: Negative.    Respiratory: Negative for cough and shortness of breath.    Cardiovascular: Positive for chest pain. Negative for leg swelling.   Gastrointestinal: Positive for abdominal pain. Negative for nausea and vomiting.   Genitourinary: Negative for dysuria.   Musculoskeletal: Negative for myalgias.   Skin: Negative for rash.   Neurological: Negative for focal weakness.        Physical Exam  Temp:  [36.1 °C (96.9 °F)-36.6 °C (97.9 °F)] 36.5 °C (97.7 °F)  Pulse:  [52-57] 57  Resp:  [16-17] 16  BP: (125-161)/(70-86) 125/74  SpO2:  [92 %-98 %] 98 %    Physical Exam  Vitals and nursing note reviewed.   Constitutional:       Appearance: He is ill-appearing.   HENT:      Head: Normocephalic and atraumatic.      Nose: Nose normal.      Mouth/Throat:      Mouth: Mucous membranes are moist.      Pharynx: Oropharynx is clear.   Eyes:      Conjunctiva/sclera: Conjunctivae normal.      Pupils: Pupils are equal, round, and reactive to light.   Cardiovascular:      Rate and Rhythm: Normal rate and regular rhythm.   Pulmonary:      Effort: Pulmonary effort is normal. No respiratory distress.      Breath sounds: No wheezing or rales.   Abdominal:      General: Abdomen is flat. Bowel sounds are normal. There is no distension.      Tenderness: There is abdominal tenderness (RUQ). There is no guarding.   Musculoskeletal:         General: Normal range of motion.      Cervical back: Normal range of motion.   Skin:     General: Skin is warm.   Neurological:      General: No focal deficit present.      Mental Status: He is alert and oriented to person, place, and time.         Fluids    Intake/Output Summary (Last 24 hours) at 3/3/2022 1451  Last data filed at 3/3/2022 0937  Gross per 24 hour   Intake 410 ml   Output --   Net 410 ml       Laboratory  Recent Labs     03/02/22  0128 03/03/22  0351   WBC 6.3 5.3   RBC 4.34* 3.89*   HEMOGLOBIN 13.9* 12.3*    HEMATOCRIT 38.0* 34.8*   MCV 87.6 89.5   MCH 32.0 31.6   MCHC 36.6* 35.3   RDW 38.2 38.9   PLATELETCT 143* 119*   MPV 11.0 11.0     Recent Labs     03/02/22  0128 03/03/22  0351   SODIUM 141 140   POTASSIUM 3.7 4.0   CHLORIDE 105 109   CO2 21 22   GLUCOSE 110* 100*   BUN 17 14   CREATININE 0.62 0.74   CALCIUM 9.2 8.4*                   Imaging  EC-ECHOCARDIOGRAM COMPLETE W/O CONT   Final Result      US-RUQ   Final Result      Cholelithiasis and sludge without positive sonographic Esparza's sign or gallbladder wall thickening      Mild right pelviectasis without james hydronephrosis.      DX-CHEST-PORTABLE (1 VIEW)   Final Result      No radiographic evidence of consolidation      Mild aortic ectasia           Assessment/Plan  * Bradycardia- (present on admission)  Assessment & Plan  Telemetry monitoring  TSH 1.150  Echo demonstrated normal left ventricular size, wall thickness, systolic, and diastolic function. EF 60%. Normal right ventricular size and systolic function. Normal left atrial size. Mild mitral annular calcification and calcification mitral apparatus.    Cholelithiasis  Assessment & Plan  With elevated LFTs, bilirubin  Now down trending  Possible stone passed  General surgery consulted  Plan lap julia tomorrow  NPO midnight    Hypertension  Assessment & Plan  Lisinopril  IV hydralazine PRN     Transaminitis  Assessment & Plan  Improving Likely secondary to cholelithiasis  Monitor    Chest pain  Assessment & Plan  Initial troponin negative  EKG showing sinus bradycardia with no significant ST-T wave changes  Continue with aspirin 81 mg daily  Cardiology recommended no further cardiac work-up, optimize blood pressure.       VTE prophylaxis: enoxaparin ppx    I have performed a physical exam and reviewed and updated ROS and Plan today (3/3/2022). In review of yesterday's note (3/2/2022), there are no changes except as documented above.

## 2022-03-03 NOTE — PROGRESS NOTES
Assumed care of pt. Bedside report received from JULY Delarosa. Pt was updated on plan of care. Call light, phone and personal belongings in reach. Bed in lowest position, and locked.

## 2022-03-03 NOTE — CONSULTS
Cardiology Consult Note:    Maksim Duque M.D.  Date & Time note created:    3/3/2022   10:37 AM     Referring MD:  Dr. Sierra    Patient ID:   Name:             Ton Christine   YOB: 1945  Age:                 77 y.o.  male   MRN:               [id number]                                                             Chief Complaint / Reason for consult:  Chest pain    History of Present Illness:    This is a 77 years old man with no prior cardiac problems, presented to the hospital for cardiac care and evaluation for chest pain. Patient has chest pain at sporadic times. No specific precipitating factors or worsening factors. Chest pain is described to be pressure-like sensation however localized at anterior chest without radition. Lasting for seconds to minutes. No prior cardiac problems. No prior cardiac workup.    Of note, he presented with elevated blood pressure with systolic of 160s.    I have independently interpreted and reviewed echocardiogram's actual images with patient which showed normal left ventricular systolic function. No wall motion abnormality. No evidence of pulmonary hypertension. No significant valvular disease.    I have personally interpreted EKG today with patient, there is no evidence of acute coronary syndrome, no evidence of prior infarct, normal MN and QT interval, no significant conduction disease. Sinus rhythm.    Troponin T has been negative x2.  I personally interpret the blood test result.    Patient does have elevated liver enzymes on blood tests.  Normal GFR.  Normal electrolytes.    Review of Systems:      Constitutional: Denies fevers, Denies weight changes  Eyes: Denies changes in vision, no eye pain  Ears/Nose/Throat/Mouth: Denies nasal congestion or sore throat   Cardiovascular: no chest pain, no palpitations   Respiratory: no shortness of breath , Denies cough  Gastrointestinal/Hepatic: Denies abdominal pain, nausea, vomiting, diarrhea,  constipation or GI bleeding   Genitourinary: Denies dysuria or frequency  Musculoskeletal/Rheum: Denies  joint pain and swelling   Skin: Denies rash  Neurological: Denies headache, confusion, memory loss or focal weakness/parasthesias  Psychiatric: denies mood disorder   Endocrine: Ila thyroid problems  Heme/Oncology/Lymph Nodes: Denies enlarged lymph nodes, denies brusing or known bleeding disorder  All other systems were reviewed and are negative (AMA/CMS criteria)                Past Medical History:   Past Medical History:   Diagnosis Date   • Hypertension    • Hypertensive urgency 3/3/2022     Active Hospital Problems    Diagnosis    • Hypertensive urgency [I16.0]    • Bradycardia [R00.1]    • Chest pain [R07.9]    • Transaminitis [R74.01]    • Hypertension [I10]        Past Surgical History:  History reviewed. No pertinent surgical history.    Hospital Medications:    Current Facility-Administered Medications:   •  NS infusion, , Intravenous, Continuous, Danae Gautam D.O., Last Rate: 150 mL/hr at 03/03/22 0939, New Bag at 03/03/22 0939  •  aspirin EC (ECOTRIN) tablet 81 mg, 81 mg, Oral, DAILY, Puma Stoner M.D., 81 mg at 03/02/22 1705  •  tamsulosin (FLOMAX) capsule 0.4 mg, 0.4 mg, Oral, DAILY, Puma Stoner M.D., 0.4 mg at 03/02/22 1704  •  enoxaparin (LOVENOX) inj 40 mg, 40 mg, Subcutaneous, DAILY, Puma Stoner M.D., 40 mg at 03/03/22 0411  •  hydrALAZINE (APRESOLINE) injection 10 mg, 10 mg, Intravenous, Q6HRS PRN, Puma Stoner M.D.  •  acetaminophen (Tylenol) tablet 650 mg, 650 mg, Oral, Q4HRS PRN, 650 mg at 03/03/22 0749 **OR** acetaminophen (TYLENOL) suppository 650 mg, 650 mg, Rectal, Q4HRS PRN, Jay Guerra M.D.  •  oxyCODONE immediate-release (ROXICODONE) tablet 5 mg, 5 mg, Oral, Q4HRS PRN, Jay Guerra M.D.  •  lisinopril (PRINIVIL) tablet 10 mg, 10 mg, Oral, Q EVENING, Charlotte Michelle M.D., 10 mg at 03/02/22 1925    Current Outpatient Medications:  Medications Prior to  Admission   Medication Sig Dispense Refill Last Dose   • lisinopril (PRINIVIL) 10 MG Tab Take 1 Tablet by mouth every day.   3/1/2022 at 2130   • simvastatin (ZOCOR) 40 MG Tab Take 40 mg by mouth every evening.   3/1/2022 at 2130   • tamsulosin (FLOMAX) 0.4 MG capsule Take 0.4 mg by mouth every day.   3/1/2022 at 2130   • acetaminophen (TYLENOL) 500 MG Tab Take 500-1,000 mg by mouth every 6 hours as needed for Mild Pain.   3/1/2022 at 2130   • therapeutic multivitamin-minerals (THERAGRAN-M) Tab Take 1 Tablet by mouth every day. Centrum Mens   3/1/2022 at 2130   • fexofenadine (ALLEGRA) 60 MG Tab Take 60 mg by mouth every day.   3/1/2022 at AM   • aspirin 81 MG EC tablet Take 81 mg by mouth every day.   3/1/2022 at 2130       Medication Allergy:  Allergies   Allergen Reactions   • Ampicillin Unspecified     Childhood allergy/ unkn reaction       Family History:  History reviewed. No pertinent family history.    Social History:  Social History     Socioeconomic History   • Marital status:      Spouse name: Not on file   • Number of children: Not on file   • Years of education: Not on file   • Highest education level: Not on file   Occupational History   • Not on file   Tobacco Use   • Smoking status: Never Smoker   • Smokeless tobacco: Never Used   Vaping Use   • Vaping Use: Never used   Substance and Sexual Activity   • Alcohol use: Not Currently   • Drug use: Never   • Sexual activity: Not on file   Other Topics Concern   • Not on file   Social History Narrative   • Not on file     Social Determinants of Health     Financial Resource Strain: Not on file   Food Insecurity: Not on file   Transportation Needs: Not on file   Physical Activity: Not on file   Stress: Not on file   Social Connections: Not on file   Intimate Partner Violence: Not on file   Housing Stability: Not on file         Physical Exam:  Vitals/ General Appearance:   Weight/BMI: Body mass index is 24.02 kg/m².  BP (!) 161/79   Pulse (!) 53    "Temp 36.4 °C (97.6 °F) (Temporal)   Resp 17   Ht 1.727 m (5' 8\")   Wt 71.7 kg (158 lb)   SpO2 95%   Vitals:    03/03/22 0053 03/03/22 0338 03/03/22 0747 03/03/22 0749   BP: 141/85 148/85 (!) 161/79    Pulse: (!) 56 (!) 54 (!) 53    Resp: 16 16 17    Temp: 36.1 °C (96.9 °F) 36.1 °C (97 °F) 36.4 °C (97.6 °F)    TempSrc: Temporal Temporal Temporal    SpO2: 93% 93%  95%   Weight:       Height:         Oxygen Therapy:  Pulse Oximetry: 95 %, O2 (LPM): 0, O2 Delivery Device: None - Room Air    Constitutional:   No acute distress  HENMT:  Normocephalic, Atraumatic.  Eyes:  EOMI, No discharge.  Neck:  no JVD.  Cardiovascular:  Normal heart rate, Normal rhythm.  Extremitites with intact distal pulses, no cyanosis, or edema.  Lungs:  No respiratory distress.  Abdomen: Soft, No tenderness, No guarding, No rebound.  Skin: No significant rash.  Neurologic: Alert & oriented x 3, No focal deficits noted, cranial nerves II through X are intact.  Psychiatric: Affect normal, Judgment normal, Mood normal.      MDM (Data Review):     Records reviewed and summarized in current documentation    Lab Data Review:  Recent Results (from the past 24 hour(s))   EC-ECHOCARDIOGRAM COMPLETE W/O CONT    Collection Time: 03/02/22 10:58 AM   Result Value Ref Range    Eject.Frac. MOD BP 66.41     Eject.Frac. MOD 4C 69.45     Eject.Frac. MOD 2C 56.57     Left Ventrical Ejection Fraction 60    TSH WITH REFLEX TO FT4    Collection Time: 03/02/22  2:49 PM   Result Value Ref Range    TSH 1.150 0.380 - 5.330 uIU/mL   CBC WITH DIFFERENTIAL    Collection Time: 03/03/22  3:51 AM   Result Value Ref Range    WBC 5.3 4.8 - 10.8 K/uL    RBC 3.89 (L) 4.70 - 6.10 M/uL    Hemoglobin 12.3 (L) 14.0 - 18.0 g/dL    Hematocrit 34.8 (L) 42.0 - 52.0 %    MCV 89.5 81.4 - 97.8 fL    MCH 31.6 27.0 - 33.0 pg    MCHC 35.3 33.7 - 35.3 g/dL    RDW 38.9 35.9 - 50.0 fL    Platelet Count 119 (L) 164 - 446 K/uL    MPV 11.0 9.0 - 12.9 fL    Neutrophils-Polys 60.80 44.00 - 72.00 % "    Lymphocytes 29.30 22.00 - 41.00 %    Monocytes 7.40 0.00 - 13.40 %    Eosinophils 1.50 0.00 - 6.90 %    Basophils 0.60 0.00 - 1.80 %    Immature Granulocytes 0.40 0.00 - 0.90 %    Nucleated RBC 0.00 /100 WBC    Neutrophils (Absolute) 3.19 1.82 - 7.42 K/uL    Lymphs (Absolute) 1.54 1.00 - 4.80 K/uL    Monos (Absolute) 0.39 0.00 - 0.85 K/uL    Eos (Absolute) 0.08 0.00 - 0.51 K/uL    Baso (Absolute) 0.03 0.00 - 0.12 K/uL    Immature Granulocytes (abs) 0.02 0.00 - 0.11 K/uL    NRBC (Absolute) 0.00 K/uL   Comp Metabolic Panel    Collection Time: 03/03/22  3:51 AM   Result Value Ref Range    Sodium 140 135 - 145 mmol/L    Potassium 4.0 3.6 - 5.5 mmol/L    Chloride 109 96 - 112 mmol/L    Co2 22 20 - 33 mmol/L    Anion Gap 9.0 7.0 - 16.0    Glucose 100 (H) 65 - 99 mg/dL    Bun 14 8 - 22 mg/dL    Creatinine 0.74 0.50 - 1.40 mg/dL    Calcium 8.4 (L) 8.5 - 10.5 mg/dL    AST(SGOT) 191 (H) 12 - 45 U/L    ALT(SGPT) 252 (H) 2 - 50 U/L    Alkaline Phosphatase 151 (H) 30 - 99 U/L    Total Bilirubin 1.9 (H) 0.1 - 1.5 mg/dL    Albumin 3.8 3.2 - 4.9 g/dL    Total Protein 5.7 (L) 6.0 - 8.2 g/dL    Globulin 1.9 1.9 - 3.5 g/dL    A-G Ratio 2.0 g/dL   ESTIMATED GFR    Collection Time: 03/03/22  3:51 AM   Result Value Ref Range    GFR If African American >60 >60 mL/min/1.73 m 2    GFR If Non African American >60 >60 mL/min/1.73 m 2       Imaging/Procedures Review:    Chest Xray:  Reviewed    EKG:   As in HPI.     MDM (Assessment and Plan):     Active Hospital Problems    Diagnosis    • Hypertensive urgency [I16.0]    • Bradycardia [R00.1]    • Chest pain [R07.9]    • Transaminitis [R74.01]    • Hypertension [I10]        No clinical evidence of acute coronary syndrome.  No further inpatient cardiac work-up is indicated at this time.  Optimize blood pressure control as this could have contributed to his symptomatology.  Ok to proceed with surgical treatment for gallstones if required from cardiology standpoint. No further work up is  indicated.  No clinical evidence of acute heart failure decompensation.  In addition, no clinical concern for bradycardia without symptoms.    Will sign off at this time, please call us with further questions.  Patient will be followed in the outpatient cardiology clinic for further cardiac care.    Thank you for referring this patient to our cardiology service.    Maksim Duque MD.   [facility].

## 2022-03-03 NOTE — ASSESSMENT & PLAN NOTE
With elevated LFTs, bilirubin  Now down trending  Possible stone passed  General surgery consulted  Patient underwent lap julia on 3/4

## 2022-03-03 NOTE — HOSPITAL COURSE
A 77-year-old man with h/o HTN, TIA presented with chest pain that started on Tuesday while sitting in recliner. He has never experienced this in the past. Patient reports having a stress test 3 years ago which was normal. He also reports that 2 days ago he started looking at his iWatch and noticed that his heart rate was ranging from 40s to 60s which prompted him to come to the hospital.   In the ER, he was noted to have elevated LFTs. Sonogram showed cholelithiasis without any evidence of cholecystitis.    EKG showed sinus bradycardia with no significant ST-T wave changes of ischemia. Trop negative x 2.  Echo demonstrated normal left ventricular size, wall thickness, systolic, and diastolic function. EF 60%. Normal right ventricular size and systolic function. Normal left atrial size. Mild mitral annular calcification and calcification mitral apparatus.

## 2022-03-03 NOTE — CARE PLAN
Problem: Knowledge Deficit - Standard  Goal: Patient and family/care givers will demonstrate understanding of plan of care, disease process/condition, diagnostic tests and medications  3/2/2022 1946 by Isaura Madsen R.N.  Outcome: Progressing  3/2/2022 1946 by Isaura Madsen R.N.  Outcome: Progressing     Problem: Pain - Standard  Goal: Alleviation of pain or a reduction in pain to the patient’s comfort goal  3/2/2022 1946 by Isaura Madsen R.N.  Outcome: Progressing  3/2/2022 1946 by Isaura Madsen R.N.  Outcome: Progressing     Problem: Fall Risk  Goal: Patient will remain free from falls  Outcome: Progressing  Note: Patient is a no fall risk. Patient has been educated on falls and to call if he feel dizzy or needs assistance.    The patient is Stable - Low risk of patient condition declining or worsening    Shift Goals  Clinical Goals: monitor pian/ heart rate  Patient Goals: sleep  Family Goals: comfort    Progress made toward(s) clinical / shift goals:  Patient reports his pain in his chest has decreased.     Patient is not progressing towards the following goals:

## 2022-03-03 NOTE — CARE PLAN
The patient is Stable - Low risk of patient condition declining or worsening    Shift Goals  Clinical Goals: Gen surg eval  Patient Goals: Pain control  Family Goals: comfort    Progress made toward(s) clinical / shift goals:    Problem: Knowledge Deficit - Standard  Goal: Patient and family/care givers will demonstrate understanding of plan of care, disease process/condition, diagnostic tests and medications  Outcome: Progressing     Problem: Pain - Standard  Goal: Alleviation of pain or a reduction in pain to the patient’s comfort goal  Outcome: Progressing     Problem: Fall Risk  Goal: Patient will remain free from falls  Outcome: Progressing       Patient is not progressing towards the following goals:

## 2022-03-03 NOTE — PROGRESS NOTES
Assumed care of patient, bedside report received from [place] day shift RN. Updated on plan of care, call light within reach and fall precautions are in place and bed lock and in lowest position. Patient instructed to call for assistance before getting out of bed. All questions answered at this time. No other needs. Goal for tonight is pain management and sleep.

## 2022-03-03 NOTE — CONSULTS
CHIEF COMPLAINT: Epistastric pain, elevated liver function tests   Requesting physician: Dr. Chin Sierra MD of the hospitalist service for epigastric pain and elevated liver function tests associated with cholelithiasis.    HISTORY OF PRESENT ILLNESS: The patient is a 77 year-old White man who presents to the Emergency Department a 2- day history of severe epigastric abdominal pain. The pain is associated with nausea and vomiting. He denies any food intolerance or temporal relationship between symptoms and eating. He denies a family history of gallstones. The patient denies any recent or intercurrent illness. The patient denies any recent or intercurrent illness. The patient denies any history of previous abdominal surgery.    PAST MEDICAL HISTORY:  has a past medical history of Hypertension and Hypertensive urgency (3/3/2022).    PAST SURGICAL HISTORY:  has no past surgical history on file.    ALLERGIES:   Allergies   Allergen Reactions   • Ampicillin Unspecified     Childhood allergy/ unkn reaction       CURRENT MEDICATIONS:    Home Medications     Reviewed by Kameron Castro (Pharmacy Tech) on 03/02/22 at 0433  Med List Status: Complete   Medication Last Dose Status   acetaminophen (TYLENOL) 500 MG Tab 3/1/2022 Active   aspirin 81 MG EC tablet 3/1/2022 Active   fexofenadine (ALLEGRA) 60 MG Tab 3/1/2022 Active   lisinopril (PRINIVIL) 10 MG Tab 3/1/2022 Active   simvastatin (ZOCOR) 40 MG Tab 3/1/2022 Active   tamsulosin (FLOMAX) 0.4 MG capsule 3/1/2022 Active   therapeutic multivitamin-minerals (THERAGRAN-M) Tab 3/1/2022 Active                FAMILY HISTORY: family history is not on file.    SOCIAL HISTORY:  reports that he has never smoked. He has never used smokeless tobacco. He reports previous alcohol use. He reports that he does not use drugs.    REVIEW OF SYSTEMS: Review of systems is remarkable for the following severe abdominal pain, bradycardia. The remainder of the comprehensive ROS is  "negative with the exception of the aforementioned HPI, PMH, and PSH bullets in accordance with CMS guideline.    PHYSICAL EXAMINATION:      Constitutional:     Vital Signs: /74   Pulse (!) 57   Temp 36.5 °C (97.7 °F) (Temporal)   Resp 16   Ht 1.727 m (5' 8\")   Wt 71.7 kg (158 lb)   SpO2 98%    General Appearance: appears stated age.  HEENT: The pupils are equal, round, and reactive to light bilaterally. The extraocular muscles are intact bilaterally.. The sclera are an icteric. Nares and oropharynx are clear.   Neck: Supple. No adenopathy.  Respiratory:   Inspection: Unlabored respirations, no intercostal retractions, paradoxical motion, or accessory muscle use.   Auscultation: clear to auscultation.  Cardiovascular:   Inspection: The skin is warm and dry.  Auscultation: Regular rate and rhythm.   Peripheral Pulses: 2+DP and radial pulses bilaterally.   Abdomen:  Inspection: Abdominal inspection reveals no abrasions, contusions, lacerations or penetrating wounds.   Palpation: Palpation is remarkable for no significant tenderness, guarding, or peritoneal findings. No abdominal wall hernias.  Extremities:   Examination of the upper and lower extremities demonstrates no cyanosis edema or clubbing.  Neurologic:   Alert & oriented to person, time and place. Normal motor function. Normal sensory function. No focal deficits noted.    LABORATORY VALUES:   Recent Labs     03/02/22 0128 03/03/22 0351   WBC 6.3 5.3   RBC 4.34* 3.89*   HEMOGLOBIN 13.9* 12.3*   HEMATOCRIT 38.0* 34.8*   MCV 87.6 89.5   MCH 32.0 31.6   MCHC 36.6* 35.3   RDW 38.2 38.9   PLATELETCT 143* 119*   MPV 11.0 11.0     Recent Labs     03/02/22 0128 03/03/22  0351   SODIUM 141 140   POTASSIUM 3.7 4.0   CHLORIDE 105 109   CO2 21 22   GLUCOSE 110* 100*   BUN 17 14   CREATININE 0.62 0.74   CALCIUM 9.2 8.4*     Recent Labs     03/02/22 0128 03/03/22  0351   ASTSGOT 599* 191*   ALTSGPT 335* 252*   TBILIRUBIN 2.6* 1.9*   ALKPHOSPHAT 154* 151* "   GLOBULIN 2.5 1.9            IMAGING:   EC-ECHOCARDIOGRAM COMPLETE W/O CONT   Final Result      US-RUQ   Final Result      Cholelithiasis and sludge without positive sonographic Esparza's sign or gallbladder wall thickening      Mild right pelviectasis without james hydronephrosis.      DX-CHEST-PORTABLE (1 VIEW)   Final Result      No radiographic evidence of consolidation      Mild aortic ectasia          ASSESSMENT AND PLAN:     Chest pain  Initial troponin negative  EKG showing sinus bradycardia with no significant ST-T wave changes  Continue with aspirin 81 mg daily  Cardiology recommended no further cardiac work-up, optimize blood pressure.    Bradycardia  Telemetry monitoring  TSH 1.150  Echo demonstrated normal left ventricular size, wall thickness, systolic, and diastolic function. EF 60%. Normal right ventricular size and systolic function. Normal left atrial size. Mild mitral annular calcification and calcification mitral apparatus.    Transaminitis  Improving Likely secondary to cholelithiasis  Monitor    Hypertension  Lisinopril  IV hydralazine PRN     Cholelithiasis  With elevated LFTs, bilirubin  Now down trending  Possible stone passed  General surgery consulted      The patient has cholelithiasis with signs of  choledocholithiasis that passed.   Plan: laparoscopic cholecystectomy.    The patient will be taken to the operating room for laparoscopic cholecystectomy. The patient has already eaten today, so will be scheduled for Dr. Mcghee at 7:30Am tomorrow. The surgical conduct was discussed in detail. Potential complications including, but not limited to, infection, bleeding, damage to adjacent structures, bile duct injury, need to convert to an open procedure, and anesthetic complications were discussed. Questions were elicited and answered to the patient's satisfaction.      ____________________________________     Manjula Figueredo M.D.    DD: 3/3/2022  2:50 PM    [place] Guidelines for Acute  Cholecystitis 2018  ACS NSQIP Surgical Risk Calculator

## 2022-03-04 ENCOUNTER — ANESTHESIA (OUTPATIENT)
Dept: SURGERY | Facility: MEDICAL CENTER | Age: 77
DRG: 419 | End: 2022-03-04
Payer: MEDICARE

## 2022-03-04 ENCOUNTER — ANESTHESIA EVENT (OUTPATIENT)
Dept: SURGERY | Facility: MEDICAL CENTER | Age: 77
DRG: 419 | End: 2022-03-04
Payer: MEDICARE

## 2022-03-04 LAB
ALBUMIN SERPL BCP-MCNC: 3.7 G/DL (ref 3.2–4.9)
ALBUMIN/GLOB SERPL: 1.9 G/DL
ALP SERPL-CCNC: 155 U/L (ref 30–99)
ALT SERPL-CCNC: 199 U/L (ref 2–50)
ANION GAP SERPL CALC-SCNC: 11 MMOL/L (ref 7–16)
APTT PPP: 31.7 SEC (ref 24.7–36)
AST SERPL-CCNC: 104 U/L (ref 12–45)
BILIRUB SERPL-MCNC: 1.3 MG/DL (ref 0.1–1.5)
BUN SERPL-MCNC: 12 MG/DL (ref 8–22)
CALCIUM SERPL-MCNC: 8.3 MG/DL (ref 8.5–10.5)
CHLORIDE SERPL-SCNC: 110 MMOL/L (ref 96–112)
CO2 SERPL-SCNC: 20 MMOL/L (ref 20–33)
CREAT SERPL-MCNC: 0.78 MG/DL (ref 0.5–1.4)
ERYTHROCYTE [DISTWIDTH] IN BLOOD BY AUTOMATED COUNT: 38.9 FL (ref 35.9–50)
EXTERNAL QUALITY CONTROL: NORMAL
GLOBULIN SER CALC-MCNC: 2 G/DL (ref 1.9–3.5)
GLUCOSE SERPL-MCNC: 87 MG/DL (ref 65–99)
HCT VFR BLD AUTO: 33.9 % (ref 42–52)
HGB BLD-MCNC: 12.3 G/DL (ref 14–18)
INR PPP: 1.11 (ref 0.87–1.13)
MCH RBC QN AUTO: 32.2 PG (ref 27–33)
MCHC RBC AUTO-ENTMCNC: 36.3 G/DL (ref 33.7–35.3)
MCV RBC AUTO: 88.7 FL (ref 81.4–97.8)
PATHOLOGY CONSULT NOTE: NORMAL
PLATELET # BLD AUTO: 124 K/UL (ref 164–446)
PMV BLD AUTO: 11.5 FL (ref 9–12.9)
POTASSIUM SERPL-SCNC: 3.7 MMOL/L (ref 3.6–5.5)
PROT SERPL-MCNC: 5.7 G/DL (ref 6–8.2)
PROTHROMBIN TIME: 14 SEC (ref 12–14.6)
RBC # BLD AUTO: 3.82 M/UL (ref 4.7–6.1)
SARS-COV+SARS-COV-2 AG RESP QL IA.RAPID: NEGATIVE
SODIUM SERPL-SCNC: 141 MMOL/L (ref 135–145)
WBC # BLD AUTO: 5.3 K/UL (ref 4.8–10.8)

## 2022-03-04 PROCEDURE — 501583 HCHG TROCAR, THRD CAN&SEAL 5X100: Performed by: SURGERY

## 2022-03-04 PROCEDURE — 85610 PROTHROMBIN TIME: CPT

## 2022-03-04 PROCEDURE — 501399 HCHG SPECIMAN BAG, ENDO CATC: Performed by: SURGERY

## 2022-03-04 PROCEDURE — 700102 HCHG RX REV CODE 250 W/ 637 OVERRIDE(OP): Performed by: STUDENT IN AN ORGANIZED HEALTH CARE EDUCATION/TRAINING PROGRAM

## 2022-03-04 PROCEDURE — 160002 HCHG RECOVERY MINUTES (STAT): Performed by: SURGERY

## 2022-03-04 PROCEDURE — 80053 COMPREHEN METABOLIC PANEL: CPT

## 2022-03-04 PROCEDURE — 96376 TX/PRO/DX INJ SAME DRUG ADON: CPT

## 2022-03-04 PROCEDURE — 501577 HCHG TROCAR, STEP 11MM: Performed by: SURGERY

## 2022-03-04 PROCEDURE — 160029 HCHG SURGERY MINUTES - 1ST 30 MINS LEVEL 4: Performed by: SURGERY

## 2022-03-04 PROCEDURE — 87426 SARSCOV CORONAVIRUS AG IA: CPT | Performed by: SURGERY

## 2022-03-04 PROCEDURE — 502571 HCHG PACK, LAP CHOLE: Performed by: SURGERY

## 2022-03-04 PROCEDURE — 85027 COMPLETE CBC AUTOMATED: CPT

## 2022-03-04 PROCEDURE — 160009 HCHG ANES TIME/MIN: Performed by: SURGERY

## 2022-03-04 PROCEDURE — 88304 TISSUE EXAM BY PATHOLOGIST: CPT

## 2022-03-04 PROCEDURE — 160035 HCHG PACU - 1ST 60 MINS PHASE I: Performed by: SURGERY

## 2022-03-04 PROCEDURE — 700111 HCHG RX REV CODE 636 W/ 250 OVERRIDE (IP): Performed by: STUDENT IN AN ORGANIZED HEALTH CARE EDUCATION/TRAINING PROGRAM

## 2022-03-04 PROCEDURE — 36415 COLL VENOUS BLD VENIPUNCTURE: CPT

## 2022-03-04 PROCEDURE — 501570 HCHG TROCAR, SEPARATOR: Performed by: SURGERY

## 2022-03-04 PROCEDURE — 501838 HCHG SUTURE GENERAL: Performed by: SURGERY

## 2022-03-04 PROCEDURE — A9270 NON-COVERED ITEM OR SERVICE: HCPCS | Performed by: STUDENT IN AN ORGANIZED HEALTH CARE EDUCATION/TRAINING PROGRAM

## 2022-03-04 PROCEDURE — A9270 NON-COVERED ITEM OR SERVICE: HCPCS | Performed by: INTERNAL MEDICINE

## 2022-03-04 PROCEDURE — 700101 HCHG RX REV CODE 250: Performed by: EMERGENCY MEDICINE

## 2022-03-04 PROCEDURE — 99225 PR SUBSEQUENT OBSERVATION CARE,LEVEL II: CPT | Performed by: STUDENT IN AN ORGANIZED HEALTH CARE EDUCATION/TRAINING PROGRAM

## 2022-03-04 PROCEDURE — 700102 HCHG RX REV CODE 250 W/ 637 OVERRIDE(OP): Performed by: INTERNAL MEDICINE

## 2022-03-04 PROCEDURE — 160048 HCHG OR STATISTICAL LEVEL 1-5: Performed by: SURGERY

## 2022-03-04 PROCEDURE — 160041 HCHG SURGERY MINUTES - EA ADDL 1 MIN LEVEL 4: Performed by: SURGERY

## 2022-03-04 PROCEDURE — 700102 HCHG RX REV CODE 250 W/ 637 OVERRIDE(OP): Performed by: HOSPITALIST

## 2022-03-04 PROCEDURE — 501568 HCHG TROCAR, BLUNTPORT 12MM: Performed by: SURGERY

## 2022-03-04 PROCEDURE — 0FT44ZZ RESECTION OF GALLBLADDER, PERCUTANEOUS ENDOSCOPIC APPROACH: ICD-10-PCS | Performed by: SURGERY

## 2022-03-04 PROCEDURE — 47562 LAPAROSCOPIC CHOLECYSTECTOMY: CPT | Performed by: SURGERY

## 2022-03-04 PROCEDURE — 85730 THROMBOPLASTIN TIME PARTIAL: CPT

## 2022-03-04 PROCEDURE — 700101 HCHG RX REV CODE 250: Performed by: SURGERY

## 2022-03-04 PROCEDURE — A9270 NON-COVERED ITEM OR SERVICE: HCPCS | Performed by: HOSPITALIST

## 2022-03-04 PROCEDURE — 700111 HCHG RX REV CODE 636 W/ 250 OVERRIDE (IP): Performed by: EMERGENCY MEDICINE

## 2022-03-04 PROCEDURE — 700105 HCHG RX REV CODE 258: Performed by: EMERGENCY MEDICINE

## 2022-03-04 PROCEDURE — G0378 HOSPITAL OBSERVATION PER HR: HCPCS

## 2022-03-04 PROCEDURE — 700111 HCHG RX REV CODE 636 W/ 250 OVERRIDE (IP): Performed by: INTERNAL MEDICINE

## 2022-03-04 RX ORDER — CEFAZOLIN SODIUM 1 G/3ML
INJECTION, POWDER, FOR SOLUTION INTRAMUSCULAR; INTRAVENOUS PRN
Status: DISCONTINUED | OUTPATIENT
Start: 2022-03-04 | End: 2022-03-04 | Stop reason: SURG

## 2022-03-04 RX ORDER — LABETALOL HYDROCHLORIDE 5 MG/ML
5 INJECTION, SOLUTION INTRAVENOUS
Status: DISCONTINUED | OUTPATIENT
Start: 2022-03-04 | End: 2022-03-04 | Stop reason: HOSPADM

## 2022-03-04 RX ORDER — HYDROMORPHONE HYDROCHLORIDE 2 MG/ML
INJECTION, SOLUTION INTRAMUSCULAR; INTRAVENOUS; SUBCUTANEOUS PRN
Status: DISCONTINUED | OUTPATIENT
Start: 2022-03-04 | End: 2022-03-04 | Stop reason: SURG

## 2022-03-04 RX ORDER — LIDOCAINE HYDROCHLORIDE 20 MG/ML
INJECTION, SOLUTION EPIDURAL; INFILTRATION; INTRACAUDAL; PERINEURAL PRN
Status: DISCONTINUED | OUTPATIENT
Start: 2022-03-04 | End: 2022-03-04 | Stop reason: SURG

## 2022-03-04 RX ORDER — ROCURONIUM BROMIDE 10 MG/ML
INJECTION, SOLUTION INTRAVENOUS PRN
Status: DISCONTINUED | OUTPATIENT
Start: 2022-03-04 | End: 2022-03-04 | Stop reason: SURG

## 2022-03-04 RX ORDER — HYDROMORPHONE HYDROCHLORIDE 1 MG/ML
0.2 INJECTION, SOLUTION INTRAMUSCULAR; INTRAVENOUS; SUBCUTANEOUS
Status: DISCONTINUED | OUTPATIENT
Start: 2022-03-04 | End: 2022-03-04 | Stop reason: HOSPADM

## 2022-03-04 RX ORDER — LABETALOL HYDROCHLORIDE 5 MG/ML
INJECTION, SOLUTION INTRAVENOUS PRN
Status: DISCONTINUED | OUTPATIENT
Start: 2022-03-04 | End: 2022-03-04 | Stop reason: SURG

## 2022-03-04 RX ORDER — OXYCODONE HYDROCHLORIDE AND ACETAMINOPHEN 5; 325 MG/1; MG/1
2 TABLET ORAL
Status: DISCONTINUED | OUTPATIENT
Start: 2022-03-04 | End: 2022-03-04 | Stop reason: HOSPADM

## 2022-03-04 RX ORDER — SODIUM CHLORIDE, SODIUM LACTATE, POTASSIUM CHLORIDE, AND CALCIUM CHLORIDE .6; .31; .03; .02 G/100ML; G/100ML; G/100ML; G/100ML
500 INJECTION, SOLUTION INTRAVENOUS ONCE
Status: DISCONTINUED | OUTPATIENT
Start: 2022-03-04 | End: 2022-03-04 | Stop reason: HOSPADM

## 2022-03-04 RX ORDER — MEPERIDINE HYDROCHLORIDE 25 MG/ML
6.25 INJECTION INTRAMUSCULAR; INTRAVENOUS; SUBCUTANEOUS
Status: DISCONTINUED | OUTPATIENT
Start: 2022-03-04 | End: 2022-03-04 | Stop reason: HOSPADM

## 2022-03-04 RX ORDER — HYDRALAZINE HYDROCHLORIDE 20 MG/ML
5 INJECTION INTRAMUSCULAR; INTRAVENOUS
Status: DISCONTINUED | OUTPATIENT
Start: 2022-03-04 | End: 2022-03-04 | Stop reason: HOSPADM

## 2022-03-04 RX ORDER — HALOPERIDOL 5 MG/ML
1 INJECTION INTRAMUSCULAR
Status: DISCONTINUED | OUTPATIENT
Start: 2022-03-04 | End: 2022-03-04 | Stop reason: HOSPADM

## 2022-03-04 RX ORDER — ONDANSETRON 2 MG/ML
4 INJECTION INTRAMUSCULAR; INTRAVENOUS
Status: DISCONTINUED | OUTPATIENT
Start: 2022-03-04 | End: 2022-03-04 | Stop reason: HOSPADM

## 2022-03-04 RX ORDER — ONDANSETRON 2 MG/ML
INJECTION INTRAMUSCULAR; INTRAVENOUS PRN
Status: DISCONTINUED | OUTPATIENT
Start: 2022-03-04 | End: 2022-03-04 | Stop reason: SURG

## 2022-03-04 RX ORDER — DEXAMETHASONE SODIUM PHOSPHATE 4 MG/ML
INJECTION, SOLUTION INTRA-ARTICULAR; INTRALESIONAL; INTRAMUSCULAR; INTRAVENOUS; SOFT TISSUE PRN
Status: DISCONTINUED | OUTPATIENT
Start: 2022-03-04 | End: 2022-03-04 | Stop reason: SURG

## 2022-03-04 RX ORDER — HYDRALAZINE HYDROCHLORIDE 20 MG/ML
INJECTION INTRAMUSCULAR; INTRAVENOUS PRN
Status: DISCONTINUED | OUTPATIENT
Start: 2022-03-04 | End: 2022-03-04 | Stop reason: SURG

## 2022-03-04 RX ORDER — MORPHINE SULFATE 4 MG/ML
1 INJECTION INTRAVENOUS ONCE
Status: COMPLETED | OUTPATIENT
Start: 2022-03-04 | End: 2022-03-04

## 2022-03-04 RX ORDER — BUPIVACAINE HYDROCHLORIDE AND EPINEPHRINE 5; 5 MG/ML; UG/ML
INJECTION, SOLUTION EPIDURAL; INTRACAUDAL; PERINEURAL
Status: DISCONTINUED | OUTPATIENT
Start: 2022-03-04 | End: 2022-03-04 | Stop reason: HOSPADM

## 2022-03-04 RX ORDER — DIPHENHYDRAMINE HYDROCHLORIDE 50 MG/ML
12.5 INJECTION INTRAMUSCULAR; INTRAVENOUS
Status: DISCONTINUED | OUTPATIENT
Start: 2022-03-04 | End: 2022-03-04 | Stop reason: HOSPADM

## 2022-03-04 RX ORDER — HYDROMORPHONE HYDROCHLORIDE 1 MG/ML
0.1 INJECTION, SOLUTION INTRAMUSCULAR; INTRAVENOUS; SUBCUTANEOUS
Status: DISCONTINUED | OUTPATIENT
Start: 2022-03-04 | End: 2022-03-04 | Stop reason: HOSPADM

## 2022-03-04 RX ORDER — HYDROMORPHONE HYDROCHLORIDE 1 MG/ML
0.4 INJECTION, SOLUTION INTRAMUSCULAR; INTRAVENOUS; SUBCUTANEOUS
Status: DISCONTINUED | OUTPATIENT
Start: 2022-03-04 | End: 2022-03-04 | Stop reason: HOSPADM

## 2022-03-04 RX ORDER — OXYCODONE HYDROCHLORIDE AND ACETAMINOPHEN 5; 325 MG/1; MG/1
1 TABLET ORAL
Status: DISCONTINUED | OUTPATIENT
Start: 2022-03-04 | End: 2022-03-04 | Stop reason: HOSPADM

## 2022-03-04 RX ORDER — SODIUM CHLORIDE, SODIUM LACTATE, POTASSIUM CHLORIDE, CALCIUM CHLORIDE 600; 310; 30; 20 MG/100ML; MG/100ML; MG/100ML; MG/100ML
INJECTION, SOLUTION INTRAVENOUS
Status: DISCONTINUED | OUTPATIENT
Start: 2022-03-04 | End: 2022-03-04 | Stop reason: SURG

## 2022-03-04 RX ORDER — ONDANSETRON 2 MG/ML
4 INJECTION INTRAMUSCULAR; INTRAVENOUS EVERY 4 HOURS PRN
Status: COMPLETED | OUTPATIENT
Start: 2022-03-04 | End: 2022-03-04

## 2022-03-04 RX ADMIN — ACETAMINOPHEN 650 MG: 325 TABLET, FILM COATED ORAL at 03:13

## 2022-03-04 RX ADMIN — ASPIRIN 81 MG: 81 TABLET, COATED ORAL at 17:32

## 2022-03-04 RX ADMIN — ONDANSETRON 4 MG: 2 INJECTION INTRAMUSCULAR; INTRAVENOUS at 20:01

## 2022-03-04 RX ADMIN — PROPOFOL 50 MG: 10 INJECTION, EMULSION INTRAVENOUS at 09:17

## 2022-03-04 RX ADMIN — OXYCODONE 5 MG: 5 TABLET ORAL at 23:50

## 2022-03-04 RX ADMIN — HYDRALAZINE HYDROCHLORIDE 10 MG: 20 INJECTION INTRAMUSCULAR; INTRAVENOUS at 08:40

## 2022-03-04 RX ADMIN — ACETAMINOPHEN 650 MG: 325 TABLET, FILM COATED ORAL at 23:54

## 2022-03-04 RX ADMIN — TAMSULOSIN HYDROCHLORIDE 0.4 MG: 0.4 CAPSULE ORAL at 17:32

## 2022-03-04 RX ADMIN — LIDOCAINE HYDROCHLORIDE 100 MG: 20 INJECTION, SOLUTION EPIDURAL; INFILTRATION; INTRACAUDAL at 07:44

## 2022-03-04 RX ADMIN — CEFAZOLIN 2 G: 330 INJECTION, POWDER, FOR SOLUTION INTRAMUSCULAR; INTRAVENOUS at 08:04

## 2022-03-04 RX ADMIN — MORPHINE SULFATE 1 MG: 4 INJECTION INTRAVENOUS at 17:32

## 2022-03-04 RX ADMIN — ACETAMINOPHEN 650 MG: 325 TABLET, FILM COATED ORAL at 20:01

## 2022-03-04 RX ADMIN — OXYCODONE 5 MG: 5 TABLET ORAL at 20:02

## 2022-03-04 RX ADMIN — LISINOPRIL 10 MG: 10 TABLET ORAL at 17:32

## 2022-03-04 RX ADMIN — ROCURONIUM BROMIDE 50 MG: 10 INJECTION, SOLUTION INTRAVENOUS at 07:44

## 2022-03-04 RX ADMIN — DEXAMETHASONE SODIUM PHOSPHATE 10 MG: 4 INJECTION, SOLUTION INTRA-ARTICULAR; INTRALESIONAL; INTRAMUSCULAR; INTRAVENOUS; SOFT TISSUE at 08:00

## 2022-03-04 RX ADMIN — SUGAMMADEX 200 MG: 100 INJECTION, SOLUTION INTRAVENOUS at 09:00

## 2022-03-04 RX ADMIN — ACETAMINOPHEN 650 MG: 325 TABLET, FILM COATED ORAL at 15:12

## 2022-03-04 RX ADMIN — OXYCODONE 5 MG: 5 TABLET ORAL at 15:11

## 2022-03-04 RX ADMIN — PROPOFOL 100 MG: 10 INJECTION, EMULSION INTRAVENOUS at 07:44

## 2022-03-04 RX ADMIN — ONDANSETRON 4 MG: 2 INJECTION INTRAMUSCULAR; INTRAVENOUS at 08:05

## 2022-03-04 RX ADMIN — FENTANYL CITRATE 100 MCG: 50 INJECTION, SOLUTION INTRAMUSCULAR; INTRAVENOUS at 07:44

## 2022-03-04 RX ADMIN — SODIUM CHLORIDE, POTASSIUM CHLORIDE, SODIUM LACTATE AND CALCIUM CHLORIDE: 600; 310; 30; 20 INJECTION, SOLUTION INTRAVENOUS at 07:39

## 2022-03-04 RX ADMIN — LABETALOL HYDROCHLORIDE 10 MG: 5 INJECTION, SOLUTION INTRAVENOUS at 08:26

## 2022-03-04 RX ADMIN — HYDROMORPHONE HYDROCHLORIDE 1 MG: 2 INJECTION INTRAMUSCULAR; INTRAVENOUS; SUBCUTANEOUS at 08:16

## 2022-03-04 RX ADMIN — LABETALOL HYDROCHLORIDE 10 MG: 5 INJECTION, SOLUTION INTRAVENOUS at 08:33

## 2022-03-04 ASSESSMENT — PAIN DESCRIPTION - PAIN TYPE
TYPE: ACUTE PAIN;SURGICAL PAIN
TYPE: SURGICAL PAIN

## 2022-03-04 ASSESSMENT — FIBROSIS 4 INDEX: FIB4 SCORE: 4.46

## 2022-03-04 ASSESSMENT — PAIN SCALES - GENERAL: PAIN_LEVEL: 2

## 2022-03-04 NOTE — PROGRESS NOTES
Surgery    77 year old male with gallstones that are symptomatic who is pre-op for cholecystectomy    BP quite high this AM - apparently his BP meds were held last night  Discussed with anesthesiology: Proceed with surgery after PRN medications have been given.    Pt and wife counselled    Consent on chart

## 2022-03-04 NOTE — ANESTHESIA POSTPROCEDURE EVALUATION
Patient: Ton Christine    Procedure Summary     Date: 03/04/22 Room / Location: [place] [address] / SURGERY [facility]    Anesthesia Start: 0739 Anesthesia Stop: 0927    Procedure: CHOLECYSTECTOMY, LAPAROSCOPIC (N/A Abdomen) Diagnosis: (Acute Cholecystitis)    Surgeons: Iam Mcghee D.O. Responsible Provider: Luca López M.D.    Anesthesia Type: general ASA Status: 2          Final Anesthesia Type: general  Last vitals  BP   Blood Pressure : (!) 181/92    Temp   36.3 °C (97.4 °F)    Pulse   (!) 51   Resp   18    SpO2   97 %      Anesthesia Post Evaluation    Patient location during evaluation: PACU  Patient participation: complete - patient participated  Level of consciousness: awake and alert  Pain score: 2    Airway patency: patent  Anesthetic complications: no  Cardiovascular status: hemodynamically stable  Respiratory status: acceptable  Hydration status: euvolemic    PONV: none          No complications documented.     Nurse Pain Score: 3 (NPRS)

## 2022-03-04 NOTE — OR NURSING
0923: Pt arrived from OR, handoff received from anesthesiologist and RN. Three lap sites to abdomen with dermabond C/D/I. OPA in place. Patient bradycardic, HR in 50s with occasional PVCs. Blood pressure parameters discussed with anesthesia.     0930: Patient arouses to voice, follows commands, moving all extremties. OPA removed. Patient denies presence of pain or nausea.     0945: Patient awake, answering questions appropriately, denies presence of numbness or tingling. Patient's spouse updated. Patient states pain at comfort goal, denies need for intervention/s at this time.     1012: Report given to AZRA Carrasco RN. Patient transported to T8 on monitor. Tele box taken back to room with patient.

## 2022-03-04 NOTE — PROGRESS NOTES
Hospital Medicine Daily Progress Note    Date of Service  3/4/2022    Chief Complaint  Ton Christine is a 77 y.o. male admitted 3/2/2022 with chest pain    Hospital Course  A 77-year-old man with h/o HTN, TIA presented with chest pain that started on Tuesday while sitting in recliner. He has never experienced this in the past. Patient reports having a stress test 3 years ago which was normal. He also reports that 2 days ago he started looking at his iWatch and noticed that his heart rate was ranging from 40s to 60s which prompted him to come to the hospital.   In the ER, he was noted to have elevated LFTs. Sonogram showed cholelithiasis without any evidence of cholecystitis.    EKG showed sinus bradycardia with no significant ST-T wave changes of ischemia. Trop negative x 2.  Echo demonstrated normal left ventricular size, wall thickness, systolic, and diastolic function. EF 60%. Normal right ventricular size and systolic function. Normal left atrial size. Mild mitral annular calcification and calcification mitral apparatus.      Interval Problem Update  3/3: Patient reports mid chest-abdominal pain 2-3/10, chest pain pleuritic, hurts with deep inspiration. Denies nausea, vomiting.  Afebrile, HR 51-61. BP stable.  Platelets 143->119.  LFTs down trending.  TSH 1.150  Cardiology recommended no further cardiac work-up, optimize blood pressure.  General surgery consulted for cholelithiasis, possible stone passage. Surgery planning lap julia tomorrow. NPO midnight.    3/4: Afebrile, heart rate 50-59. BP stable.  CBC  unremarkable.  LFTs down trending. Total bilirubin 1.3 normalized.  Patient underwent laparoscopic cholecystectomy today.    I have personally seen and examined the patient at bedside. I discussed the plan of care with patient, family, bedside RN, charge RN,  and pharmacy.    Consultants/Specialty  cardiology and general surgery    Code Status  Full Code    Disposition  Patient is not  medically cleared for discharge.   Anticipate discharge to to home with close outpatient follow-up.  I have placed the appropriate orders for post-discharge needs.    Review of Systems  Constitutional: Negative for chills, fever and malaise/fatigue.   HENT: Negative.    Eyes: Negative.    Respiratory: Negative for cough and shortness of breath.    Cardiovascular: Positive for chest pain. Negative for leg swelling.   Gastrointestinal: Positive for abdominal pain. Negative for nausea and vomiting.   Genitourinary: Negative for dysuria.   Musculoskeletal: Negative for myalgias.   Skin: Negative for rash.   Neurological: Negative for focal weakness.     Physical Exam  Temp:  [36.1 °C (97 °F)-36.8 °C (98.3 °F)] 36.4 °C (97.6 °F)  Pulse:  [50-68] 64  Resp:  [12-21] 12  BP: (117-181)/(62-92) 129/68  SpO2:  [91 %-100 %] 95 %    Physical Exam  Vitals and nursing note reviewed.   Constitutional:       Appearance: He is ill-appearing.   HENT:      Head: Normocephalic and atraumatic.      Nose: Nose normal.      Mouth/Throat:      Mouth: Mucous membranes are moist.      Pharynx: Oropharynx is clear.   Eyes:      Conjunctiva/sclera: Conjunctivae normal.      Pupils: Pupils are equal, round, and reactive to light.   Cardiovascular:      Rate and Rhythm: Normal rate and regular rhythm.   Pulmonary:      Effort: Pulmonary effort is normal. No respiratory distress.      Breath sounds: No wheezing or rales.   Abdominal:      General: Abdomen is flat. Bowel sounds are normal. There is no distension.      Tenderness: There is abdominal tenderness (RUQ). There is no guarding.   Musculoskeletal:         General: Normal range of motion.      Cervical back: Normal range of motion.   Skin:     General: Skin is warm.   Neurological:      General: No focal deficit present.      Mental Status: He is alert and oriented to person, place, and time.     Fluids  No intake or output data in the 24 hours ending 03/04/22 1525    Laboratory  Recent  Labs     03/02/22  0128 03/03/22  0351 03/04/22  0359   WBC 6.3 5.3 5.3   RBC 4.34* 3.89* 3.82*   HEMOGLOBIN 13.9* 12.3* 12.3*   HEMATOCRIT 38.0* 34.8* 33.9*   MCV 87.6 89.5 88.7   MCH 32.0 31.6 32.2   MCHC 36.6* 35.3 36.3*   RDW 38.2 38.9 38.9   PLATELETCT 143* 119* 124*   MPV 11.0 11.0 11.5     Recent Labs     03/02/22  0128 03/03/22  0351 03/04/22  0359   SODIUM 141 140 141   POTASSIUM 3.7 4.0 3.7   CHLORIDE 105 109 110   CO2 21 22 20   GLUCOSE 110* 100* 87   BUN 17 14 12   CREATININE 0.62 0.74 0.78   CALCIUM 9.2 8.4* 8.3*     Recent Labs     03/04/22 0359   APTT 31.7   INR 1.11               Imaging  EC-ECHOCARDIOGRAM COMPLETE W/O CONT   Final Result      US-RUQ   Final Result      Cholelithiasis and sludge without positive sonographic Esparza's sign or gallbladder wall thickening      Mild right pelviectasis without james hydronephrosis.      DX-CHEST-PORTABLE (1 VIEW)   Final Result      No radiographic evidence of consolidation      Mild aortic ectasia           Assessment/Plan  * Bradycardia- (present on admission)  Assessment & Plan  Telemetry monitoring  TSH 1.150  Echo demonstrated normal left ventricular size, wall thickness, systolic, and diastolic function. EF 60%. Normal right ventricular size and systolic function. Normal left atrial size. Mild mitral annular calcification and calcification mitral apparatus.    Cholelithiasis  Assessment & Plan  With elevated LFTs, bilirubin  Now down trending  Possible stone passed  General surgery consulted  Patient underwent lap julia on 3/4    Hypertension  Assessment & Plan  Lisinopril  IV hydralazine PRN     Transaminitis  Assessment & Plan  Improving Likely secondary to cholelithiasis  Monitor    Chest pain  Assessment & Plan  Initial troponin negative  EKG showing sinus bradycardia with no significant ST-T wave changes  Continue with aspirin 81 mg daily  Cardiology recommended no further cardiac work-up, optimize blood pressure.       VTE prophylaxis:  SCDs/TEDs    I have performed a physical exam and reviewed and updated ROS and Plan today (3/4/2022). In review of yesterday's note (3/3/2022), there are no changes except as documented above.

## 2022-03-04 NOTE — ANESTHESIA PREPROCEDURE EVALUATION
Case: [id number] Date/Time: 03/04/22 0715    Procedure: CHOLECYSTECTOMY, LAPAROSCOPIC    Location: [facility] OR 14 / SURGERY [facility]    Surgeons: Iam Mcghee D.O.          Relevant Problems   CARDIAC   (positive) Hypertension   (positive) Hypertensive urgency       Physical Exam    Airway   Mallampati: II  TM distance: <3 FB  Neck ROM: full       Cardiovascular - normal exam  Rhythm: regular  Rate: normal  (-) murmur     Dental - normal exam        Facial Hair   Pulmonary - normal exam  Breath sounds clear to auscultation     Abdominal - normal exam  Abdomen: soft     Neurological - normal exam                 Anesthesia Plan    ASA 2       Plan - general       Airway plan will be ETT    (Pt has severe range blood pressures but case is non-elective. Will proceed with surgery.)      Induction: intravenous    Postoperative Plan: Postoperative administration of opioids is intended.    Pertinent diagnostic labs and testing reviewed    Informed Consent:    Anesthetic plan and risks discussed with patient.    Use of blood products discussed with: patient whom consented to blood products.

## 2022-03-04 NOTE — ANESTHESIA TIME REPORT
Anesthesia Start and Stop Event Times     Date Time Event    3/4/2022 0721 Ready for Procedure     0739 Anesthesia Start     0927 Anesthesia Stop        Responsible Staff  03/04/22    Name Role Begin End    Luca López M.D. Anesth 0739 0927        Preop Diagnosis (Free Text):  Pre-op Diagnosis     gallstones that are symptomatic        Preop Diagnosis (Codes):    Premium Reason  Non-Premium    Comments:

## 2022-03-04 NOTE — CARE PLAN
The patient is Stable - Low risk of patient condition declining or worsening    Shift Goals  Clinical Goals: Procedure tomorrow  Patient Goals: Pain control  Family Goals: comfort    Progress made toward(s) clinical / shift goals:  Patient updated on POC, all questions answered. Patient has remained free from falls. All fall precautions in place: non-slip socks, bed locked and in lowest position, bed alarm on, call light within reach. Pt's pain managed with PRN tylenol.      Problem: Knowledge Deficit - Standard  Goal: Patient and family/care givers will demonstrate understanding of plan of care, disease process/condition, diagnostic tests and medications  Outcome: Progressing     Problem: Pain - Standard  Goal: Alleviation of pain or a reduction in pain to the patient’s comfort goal  Outcome: Progressing     Problem: Fall Risk  Goal: Patient will remain free from falls  Outcome: Progressing

## 2022-03-04 NOTE — ANESTHESIA PROCEDURE NOTES
Airway    Date/Time: 3/4/2022 7:54 AM  Performed by: Luca López M.D.  Authorized by: Luca López M.D.     Location:  OR  Urgency:  Elective  Difficult Airway: No    Indications for Airway Management:  Anesthesia      Spontaneous Ventilation: absent    Sedation Level:  Deep  Preoxygenated: Yes    Patient Position:  Sniffing  Mask Difficulty Assessment:  2 - vent by mask + OA or adjuvant +/- NMBA  Final Airway Type:  Endotracheal airway  Final Endotracheal Airway:  ETT  Cuffed: Yes    Technique Used for Successful ETT Placement:  Direct laryngoscopy    Insertion Site:  Oral  Blade Type:  Tao  Laryngoscope Blade/Videolaryngoscope Blade Size:  2  ETT Size (mm):  8.0  Measured from:  Teeth  ETT to Teeth (cm):  24  Placement Verified by: auscultation and capnometry    Cormack-Lehane Classification:  Grade IIa - partial view of glottis  Number of Attempts at Approach:  2

## 2022-03-04 NOTE — OP REPORT
Operative Report    PreOp Diagnosis: Acute cholecystitis      PostOp Diagnosis: Same      Procedure(s):  CHOLECYSTECTOMY, LAPAROSCOPIC - Wound Class: Contaminated    Surgeon(s):  Iam Mcghee D.O.    Anesthesiologist/Type of Anesthesia:  Anesthesiologist: Luca López M.D./General    Surgical Staff:  Circulator: Gwen Vicente R.N.  Scrub Person: Maria Alejandra Roche    Specimens removed if any:  ID Type Source Tests Collected by Time Destination   A : Gallbladder Tissue Gallbladder PATHOLOGY SPECIMEN Iam Mcghee D.O. 3/4/2022  8:27 AM        Estimated Blood Loss: minimal    Findings: Contracted thickened, intrahepatic gallbladder with pericholecystic edema    Complications: None noted    Indication: 77-year-old male admitted by the medical service for suspect cardiac type chest pain with acute cholecystitis    OPERATIVE REPORT: The patient was brought to the operating room and placed in  supine position on the operating table. After adequate general anesthesia,   the abdomen was prepped and draped in standard fashion. An area inferior to the umbilucus was   infiltrated with 0.25% bupivacaine. An incision was made through the skin and  subcutaneous tissues. We then bluntly dissected down to the anterior fascia,  which was elevated into the wound using a Kocher clamp. A stay suture of 0   Vicryl was then placed. The fascia was then incised and we dissected through   the abdominal wall in layers until the peritoneum was entered. We then placed  the Tiarra port and insufflated the abdomen. The area in the epigastrium was  chosen for a 10 mm port. The skin and subcutaneous tissue were infiltrated   with 0.25% bupivacaine. A small incision was made and a 5 mm port was   inserted under direct camera observation. Two additional 5 mm ports were   placed in the right lateral abdominal wall using identical technique. I then   grasped the fundus of the gallbladder and retracted it anteriorly and   superiorly. The  infundibulum was retracted anteriorly and laterally. We then  skeletonized the cystic duct, doubly clip distally and singly clipped   proximally and then divided with the endoscopic shear. The cystic artery was   then skeletonized and doubly clipped proximally and singly clipped distally   prior to dividing with the endoscopic shear. The gallbladder was then   dissected free from its fossa. When this was completed, we placed in an EndoCatch bag and   retrieved it from the umbilical port site. We then irrigated the gallbladder   fossa in the right upper quadrant until the effluent was clear. There was no   sign of bleeding or bile leakage. The ports were then removed. The fascia at  the umbilical port site was closed using the stay suture placed at the   beginning of the case. The wounds were then irrigated and the skin was closed  using a 4-0 Monocryl stitch in a subcuticular fashion. The area was then   cleaned and dried Mastisol, Steri-Strips and dry sterile dressings were   applied.  Dermabond was used to close the epigastric port site.    The patient was then awakened from anesthesia and taken to   post-anesthesia care unit in stable condition. The sponge, needle, and   instrument count was correct at the end of the case and I was present for the   entirety of the case.          3/4/2022 9:19 AM Iam Mcghee D.O.

## 2022-03-05 PROBLEM — R33.9 URINARY RETENTION: Status: ACTIVE | Noted: 2022-03-05

## 2022-03-05 PROBLEM — K80.20 SYMPTOMATIC CHOLELITHIASIS: Status: ACTIVE | Noted: 2022-03-05

## 2022-03-05 LAB
ALBUMIN SERPL BCP-MCNC: 3.8 G/DL (ref 3.2–4.9)
ALBUMIN/GLOB SERPL: 1.7 G/DL
ALP SERPL-CCNC: 127 U/L (ref 30–99)
ALT SERPL-CCNC: 200 U/L (ref 2–50)
ANION GAP SERPL CALC-SCNC: 13 MMOL/L (ref 7–16)
AST SERPL-CCNC: 123 U/L (ref 12–45)
BILIRUB SERPL-MCNC: 1.5 MG/DL (ref 0.1–1.5)
BUN SERPL-MCNC: 16 MG/DL (ref 8–22)
CALCIUM SERPL-MCNC: 8.5 MG/DL (ref 8.5–10.5)
CHLORIDE SERPL-SCNC: 106 MMOL/L (ref 96–112)
CO2 SERPL-SCNC: 20 MMOL/L (ref 20–33)
CREAT SERPL-MCNC: 0.85 MG/DL (ref 0.5–1.4)
ERYTHROCYTE [DISTWIDTH] IN BLOOD BY AUTOMATED COUNT: 39.8 FL (ref 35.9–50)
GLOBULIN SER CALC-MCNC: 2.2 G/DL (ref 1.9–3.5)
GLUCOSE SERPL-MCNC: 110 MG/DL (ref 65–99)
HCT VFR BLD AUTO: 32.9 % (ref 42–52)
HGB BLD-MCNC: 11.6 G/DL (ref 14–18)
MCH RBC QN AUTO: 31.4 PG (ref 27–33)
MCHC RBC AUTO-ENTMCNC: 35.3 G/DL (ref 33.7–35.3)
MCV RBC AUTO: 89.2 FL (ref 81.4–97.8)
PLATELET # BLD AUTO: 152 K/UL (ref 164–446)
PMV BLD AUTO: 11.3 FL (ref 9–12.9)
POTASSIUM SERPL-SCNC: 3.9 MMOL/L (ref 3.6–5.5)
PROT SERPL-MCNC: 6 G/DL (ref 6–8.2)
RBC # BLD AUTO: 3.69 M/UL (ref 4.7–6.1)
SODIUM SERPL-SCNC: 139 MMOL/L (ref 135–145)
WBC # BLD AUTO: 11.5 K/UL (ref 4.8–10.8)

## 2022-03-05 PROCEDURE — 99024 POSTOP FOLLOW-UP VISIT: CPT | Performed by: SURGERY

## 2022-03-05 PROCEDURE — 770020 HCHG ROOM/CARE - TELE (206)

## 2022-03-05 PROCEDURE — 700102 HCHG RX REV CODE 250 W/ 637 OVERRIDE(OP): Performed by: STUDENT IN AN ORGANIZED HEALTH CARE EDUCATION/TRAINING PROGRAM

## 2022-03-05 PROCEDURE — 700102 HCHG RX REV CODE 250 W/ 637 OVERRIDE(OP): Performed by: INTERNAL MEDICINE

## 2022-03-05 PROCEDURE — A9270 NON-COVERED ITEM OR SERVICE: HCPCS | Performed by: STUDENT IN AN ORGANIZED HEALTH CARE EDUCATION/TRAINING PROGRAM

## 2022-03-05 PROCEDURE — A9270 NON-COVERED ITEM OR SERVICE: HCPCS | Performed by: INTERNAL MEDICINE

## 2022-03-05 PROCEDURE — 80053 COMPREHEN METABOLIC PANEL: CPT

## 2022-03-05 PROCEDURE — 85027 COMPLETE CBC AUTOMATED: CPT

## 2022-03-05 PROCEDURE — 700102 HCHG RX REV CODE 250 W/ 637 OVERRIDE(OP): Performed by: HOSPITALIST

## 2022-03-05 PROCEDURE — 700111 HCHG RX REV CODE 636 W/ 250 OVERRIDE (IP): Performed by: STUDENT IN AN ORGANIZED HEALTH CARE EDUCATION/TRAINING PROGRAM

## 2022-03-05 PROCEDURE — 36415 COLL VENOUS BLD VENIPUNCTURE: CPT

## 2022-03-05 PROCEDURE — A9270 NON-COVERED ITEM OR SERVICE: HCPCS | Performed by: HOSPITALIST

## 2022-03-05 PROCEDURE — 99232 SBSQ HOSP IP/OBS MODERATE 35: CPT | Performed by: STUDENT IN AN ORGANIZED HEALTH CARE EDUCATION/TRAINING PROGRAM

## 2022-03-05 RX ORDER — ONDANSETRON 2 MG/ML
4 INJECTION INTRAMUSCULAR; INTRAVENOUS EVERY 4 HOURS PRN
Status: DISCONTINUED | OUTPATIENT
Start: 2022-03-05 | End: 2022-03-06 | Stop reason: HOSPADM

## 2022-03-05 RX ADMIN — LISINOPRIL 10 MG: 10 TABLET ORAL at 17:48

## 2022-03-05 RX ADMIN — OXYCODONE 5 MG: 5 TABLET ORAL at 04:02

## 2022-03-05 RX ADMIN — ACETAMINOPHEN 650 MG: 325 TABLET, FILM COATED ORAL at 20:11

## 2022-03-05 RX ADMIN — TAMSULOSIN HYDROCHLORIDE 0.4 MG: 0.4 CAPSULE ORAL at 17:48

## 2022-03-05 RX ADMIN — ACETAMINOPHEN 650 MG: 325 TABLET, FILM COATED ORAL at 15:06

## 2022-03-05 RX ADMIN — ASPIRIN 81 MG: 81 TABLET, COATED ORAL at 17:48

## 2022-03-05 RX ADMIN — ONDANSETRON 4 MG: 2 INJECTION INTRAMUSCULAR; INTRAVENOUS at 14:58

## 2022-03-05 RX ADMIN — ACETAMINOPHEN 650 MG: 325 TABLET, FILM COATED ORAL at 04:03

## 2022-03-05 RX ADMIN — ACETAMINOPHEN 650 MG: 325 TABLET, FILM COATED ORAL at 10:29

## 2022-03-05 ASSESSMENT — PAIN DESCRIPTION - PAIN TYPE
TYPE: SURGICAL PAIN
TYPE: ACUTE PAIN

## 2022-03-05 ASSESSMENT — FIBROSIS 4 INDEX: FIB4 SCORE: 4.29

## 2022-03-05 NOTE — PROGRESS NOTES
Pt return to unit from PACU via bed. Drowsy but arousable, verbally responsive. C/o pain 3/10. Abdominal incision assessed, clean, dry and intact with dermabond.

## 2022-03-05 NOTE — PROGRESS NOTES
Assumed care at 0700. Bedside report received from Reina. Patient's chart and MAR reviewed. Pt complains of mild abdominal pain at this time, s/p yin dumont yesterday Pt is A & O 4. Patient was updated on plan of care for the day. Questions answered and concerns addressed.  Pt denies any additional needs at this time. White board updated. Call light, phone and personal belongings within reach.

## 2022-03-05 NOTE — PROGRESS NOTES
"      Postop day 1 after laparoscopic cholecystectomy for acute cholecystitis  ? Urinary retention but voiding  Significant periumbilical pain yesterday but better today  Postop nausea resolved  Taking some clears   not mobilizing yet    /72   Pulse 63   Temp 36.8 °C (98.2 °F) (Temporal)   Resp 12   Ht 1.727 m (5' 8\")   Wt 71.8 kg (158 lb 4.6 oz)   SpO2 95%   BMI 24.07 kg/m²     No distress  Abdomen soft with umbilical incisional tenderness  No jaundice or icterus    Recent Labs     03/03/22  0351 03/04/22 0359 03/05/22 0358   WBC 5.3 5.3 11.5*   RBC 3.89* 3.82* 3.69*   HEMOGLOBIN 12.3* 12.3* 11.6*   HEMATOCRIT 34.8* 33.9* 32.9*   MCV 89.5 88.7 89.2   MCH 31.6 32.2 31.4   RDW 38.9 38.9 39.8   PLATELETCT 119* 124* 152*   MPV 11.0 11.5 11.3   NEUTSPOLYS 60.80  --   --    LYMPHOCYTES 29.30  --   --    MONOCYTES 7.40  --   --    EOSINOPHILS 1.50  --   --    BASOPHILS 0.60  --   --      Recent Labs     03/03/22 0351 03/04/22 0359 03/05/22 0358   ASTSGOT 191* 104* 123*   ALTSGPT 252* 199* 200*   TBILIRUBIN 1.9* 1.3 1.5   ALKPHOSPHAT 151* 155* 127*   GLOBULIN 1.9 2.0 2.2   INR  --  1.11  --        Assessment and plan:  Slow progress after laparoscopic cholecystectomy  Restart Flomax  Mobilize more  Advance diet as tolerated    General management per medical service    "

## 2022-03-05 NOTE — CARE PLAN
The patient is Stable - Low risk of patient condition declining or worsening    Shift Goals  Clinical Goals: pain control  Patient Goals: pain control, rest  Family Goals: comfort    Progress made toward(s) clinical / shift goals:      Pt is actively participating in care, pain is being managed to pts comfort level, and pt is resting.  POC addressed, no other questions at this time      Problem: Knowledge Deficit - Standard  Goal: Patient and family/care givers will demonstrate understanding of plan of care, disease process/condition, diagnostic tests and medications  Outcome: Progressing     Problem: Pain - Standard  Goal: Alleviation of pain or a reduction in pain to the patient’s comfort goal  Outcome: Progressing     Problem: Fall Risk  Goal: Patient will remain free from falls  Outcome: Progressing       Patient is not progressing towards the following goals:  Na

## 2022-03-05 NOTE — PROGRESS NOTES
"      /70   Pulse 61   Temp 36.8 °C (98.2 °F) (Temporal)   Resp 12   Ht 1.727 m (5' 8\")   Wt 71.7 kg (158 lb)   SpO2 93%     Doing well on postop check.  Pain is tolerable. Has not eaten yet.  Will follow up in the morning.   "

## 2022-03-05 NOTE — PROGRESS NOTES
Hospital Medicine Daily Progress Note    Date of Service  3/5/2022    Chief Complaint  Ton Christine is a 77 y.o. male admitted 3/2/2022 with chest pain    Hospital Course  A 77-year-old man with h/o HTN, TIA presented with chest pain that started on Tuesday while sitting in recliner. He has never experienced this in the past. Patient reports having a stress test 3 years ago which was normal. He also reports that 2 days ago he started looking at his iWatch and noticed that his heart rate was ranging from 40s to 60s which prompted him to come to the hospital.   In the ER, he was noted to have elevated LFTs. Sonogram showed cholelithiasis without any evidence of cholecystitis.    EKG showed sinus bradycardia with no significant ST-T wave changes of ischemia. Trop negative x 2.  Echo demonstrated normal left ventricular size, wall thickness, systolic, and diastolic function. EF 60%. Normal right ventricular size and systolic function. Normal left atrial size. Mild mitral annular calcification and calcification mitral apparatus.      Interval Problem Update  3/3: Patient reports mid chest-abdominal pain 2-3/10, chest pain pleuritic, hurts with deep inspiration. Denies nausea, vomiting.  Afebrile, HR 51-61. BP stable.  Platelets 143->119.  LFTs down trending.  TSH 1.150  Cardiology recommended no further cardiac work-up, optimize blood pressure.  General surgery consulted for cholelithiasis, possible stone passage. Surgery planning lap julia tomorrow. NPO midnight.     3/4: Afebrile, heart rate 50-59. BP stable.  CBC  unremarkable.  LFTs down trending. Total bilirubin 1.3 normalized.  Patient underwent laparoscopic cholecystectomy today.    3/5: Patient reports suprapubic pain, nausea, vomiting, difficulty with urination. On physical exam distended, tender bladder palpated.  Afebrile, hemodynamically stable. On room air.  WBC 11.5.  Bladder scan protocol.  Surgery following, advancing diet as tolerated.    I have  personally seen and examined the patient at bedside. I discussed the plan of care with patient and bedside RN.    Consultants/Specialty  cardiology and general surgery    Code Status  Full Code    Disposition  Patient is not medically cleared for discharge.   Anticipate discharge to to home with close outpatient follow-up.  I have placed the appropriate orders for post-discharge needs.    Review of Systems  Constitutional: Negative for chills, fever and malaise/fatigue.   HENT: Negative.    Eyes: Negative.    Respiratory: Negative for cough and shortness of breath.    Cardiovascular: Negative for chest pain, leg swelling.   Gastrointestinal: Positive for abdominal pain. Negative for nausea and vomiting.   Genitourinary: Negative for dysuria.   Musculoskeletal: Negative for myalgias.   Skin: Negative for rash.   Neurological: Negative for focal weakness.     Physical Exam  Temp:  [36.4 °C (97.6 °F)-36.8 °C (98.2 °F)] 36.7 °C (98.1 °F)  Pulse:  [59-64] 59  Resp:  [12-13] 12  BP: (118-146)/(65-73) 146/73  SpO2:  [91 %-95 %] 95 %    Physical Exam  Vitals and nursing note reviewed.   Constitutional:       Appearance: He is ill-appearing.   HENT:      Head: Normocephalic and atraumatic.      Nose: Nose normal.      Mouth/Throat:      Mouth: Mucous membranes are moist.      Pharynx: Oropharynx is clear.   Eyes:      Conjunctiva/sclera: Conjunctivae normal.      Pupils: Pupils are equal, round, and reactive to light.   Cardiovascular:      Rate and Rhythm: Normal rate and regular rhythm.   Pulmonary:      Effort: Pulmonary effort is normal. No respiratory distress.      Breath sounds: No wheezing or rales.   Abdominal:      General: Abdomen is flat. Bowel sounds are normal. There is no distension.      Tenderness: There is abdominal tenderness (RUQ). There is no guarding.   Musculoskeletal:         General: Normal range of motion.      Cervical back: Normal range of motion.   Skin:     General: Skin is warm.   Neurological:       General: No focal deficit present.      Mental Status: He is alert and oriented to person, place, and time.     Fluids    Intake/Output Summary (Last 24 hours) at 3/5/2022 1305  Last data filed at 3/5/2022 0400  Gross per 24 hour   Intake --   Output 300 ml   Net -300 ml       Laboratory  Recent Labs     03/03/22  0351 03/04/22  0359 03/05/22  0358   WBC 5.3 5.3 11.5*   RBC 3.89* 3.82* 3.69*   HEMOGLOBIN 12.3* 12.3* 11.6*   HEMATOCRIT 34.8* 33.9* 32.9*   MCV 89.5 88.7 89.2   MCH 31.6 32.2 31.4   MCHC 35.3 36.3* 35.3   RDW 38.9 38.9 39.8   PLATELETCT 119* 124* 152*   MPV 11.0 11.5 11.3     Recent Labs     03/03/22  0351 03/04/22 0359 03/05/22 0358   SODIUM 140 141 139   POTASSIUM 4.0 3.7 3.9   CHLORIDE 109 110 106   CO2 22 20 20   GLUCOSE 100* 87 110*   BUN 14 12 16   CREATININE 0.74 0.78 0.85   CALCIUM 8.4* 8.3* 8.5     Recent Labs     03/04/22 0359   APTT 31.7   INR 1.11               Imaging  EC-ECHOCARDIOGRAM COMPLETE W/O CONT   Final Result      US-RUQ   Final Result      Cholelithiasis and sludge without positive sonographic Esparza's sign or gallbladder wall thickening      Mild right pelviectasis without james hydronephrosis.      DX-CHEST-PORTABLE (1 VIEW)   Final Result      No radiographic evidence of consolidation      Mild aortic ectasia           Assessment/Plan  * Cholelithiasis  Assessment & Plan  With elevated LFTs, bilirubin  Now down trending  Possible stone passed  General surgery consulted  Patient underwent lap julia on 3/4    Urinary retention  Assessment & Plan  H/o BPH  Bladder protocol  Continue tamsulosin    Hypertension  Assessment & Plan  Lisinopril  IV hydralazine PRN     Transaminitis  Assessment & Plan  Improving Likely secondary to cholelithiasis  Monitor    Chest pain  Assessment & Plan  Initial troponin negative  EKG showing sinus bradycardia with no significant ST-T wave changes  Continue with aspirin 81 mg daily  Cardiology recommended no further cardiac work-up, optimize  blood pressure.    Bradycardia- (present on admission)  Assessment & Plan  Telemetry monitoring  TSH 1.150  Echo demonstrated normal left ventricular size, wall thickness, systolic, and diastolic function. EF 60%. Normal right ventricular size and systolic function. Normal left atrial size. Mild mitral annular calcification and calcification mitral apparatus.  Cardiology consulted, no further inpatient cardiac work-up is indicated at this time.       VTE prophylaxis: SCDs/TEDs    I have performed a physical exam and reviewed and updated ROS and Plan today (3/5/2022). In review of yesterday's note (3/4/2022), there are no changes except as documented above.

## 2022-03-05 NOTE — CARE PLAN
Problem: Knowledge Deficit - Standard  Goal: Patient and family/care givers will demonstrate understanding of plan of care, disease process/condition, diagnostic tests and medications  Outcome: Progressing  Note: Education provided regarding disease process and POC. Pt verbalized understanding     Problem: Fall Risk  Goal: Patient will remain free from falls  Outcome: Progressing  Note: Remains free from falls and injuries. Fall precautions in place. Call light and belongings within reach     Problem: Pain - Standard  Goal: Alleviation of pain or a reduction in pain to the patient’s comfort goal  Flowsheets (Taken 3/4/2022 4688)  OB Pain Intervention:   Medication - See MAR   Cold pack  Note: Pain managed with pain medications   The patient is Watcher - Medium risk of patient condition declining or worsening    Shift Goals  Clinical Goals: Pain management  Patient Goals: Pain management  Family Goals: comfort    Progress made toward(s) clinical / shift goals:  Tolerating po fluids    Patient is not progressing towards the following goals:

## 2022-03-06 VITALS
RESPIRATION RATE: 16 BRPM | OXYGEN SATURATION: 93 % | HEIGHT: 68 IN | BODY MASS INDEX: 24.16 KG/M2 | HEART RATE: 58 BPM | WEIGHT: 159.39 LBS | DIASTOLIC BLOOD PRESSURE: 77 MMHG | TEMPERATURE: 98.2 F | SYSTOLIC BLOOD PRESSURE: 152 MMHG

## 2022-03-06 PROBLEM — R07.9 CHEST PAIN: Status: RESOLVED | Noted: 2022-03-02 | Resolved: 2022-03-06

## 2022-03-06 PROBLEM — I16.0 HYPERTENSIVE URGENCY: Status: RESOLVED | Noted: 2022-03-03 | Resolved: 2022-03-06

## 2022-03-06 PROBLEM — R33.9 URINARY RETENTION: Status: RESOLVED | Noted: 2022-03-05 | Resolved: 2022-03-06

## 2022-03-06 PROBLEM — K80.20 CHOLELITHIASIS: Status: RESOLVED | Noted: 2022-03-03 | Resolved: 2022-03-06

## 2022-03-06 LAB
ALBUMIN SERPL BCP-MCNC: 3.6 G/DL (ref 3.2–4.9)
ALBUMIN/GLOB SERPL: 1.6 G/DL
ALP SERPL-CCNC: 113 U/L (ref 30–99)
ALT SERPL-CCNC: 162 U/L (ref 2–50)
ANION GAP SERPL CALC-SCNC: 12 MMOL/L (ref 7–16)
AST SERPL-CCNC: 91 U/L (ref 12–45)
BILIRUB SERPL-MCNC: 1.3 MG/DL (ref 0.1–1.5)
BUN SERPL-MCNC: 16 MG/DL (ref 8–22)
CALCIUM SERPL-MCNC: 8.5 MG/DL (ref 8.5–10.5)
CHLORIDE SERPL-SCNC: 107 MMOL/L (ref 96–112)
CO2 SERPL-SCNC: 21 MMOL/L (ref 20–33)
CREAT SERPL-MCNC: 0.86 MG/DL (ref 0.5–1.4)
ERYTHROCYTE [DISTWIDTH] IN BLOOD BY AUTOMATED COUNT: 42.4 FL (ref 35.9–50)
GLOBULIN SER CALC-MCNC: 2.2 G/DL (ref 1.9–3.5)
GLUCOSE SERPL-MCNC: 90 MG/DL (ref 65–99)
HCT VFR BLD AUTO: 33.2 % (ref 42–52)
HGB BLD-MCNC: 11.4 G/DL (ref 14–18)
MCH RBC QN AUTO: 32 PG (ref 27–33)
MCHC RBC AUTO-ENTMCNC: 34.3 G/DL (ref 33.7–35.3)
MCV RBC AUTO: 93.3 FL (ref 81.4–97.8)
PLATELET # BLD AUTO: 129 K/UL (ref 164–446)
PMV BLD AUTO: 11.7 FL (ref 9–12.9)
POTASSIUM SERPL-SCNC: 3.5 MMOL/L (ref 3.6–5.5)
PROT SERPL-MCNC: 5.8 G/DL (ref 6–8.2)
RBC # BLD AUTO: 3.56 M/UL (ref 4.7–6.1)
SODIUM SERPL-SCNC: 140 MMOL/L (ref 135–145)
WBC # BLD AUTO: 8.9 K/UL (ref 4.8–10.8)

## 2022-03-06 PROCEDURE — 36415 COLL VENOUS BLD VENIPUNCTURE: CPT

## 2022-03-06 PROCEDURE — 99239 HOSP IP/OBS DSCHRG MGMT >30: CPT | Performed by: STUDENT IN AN ORGANIZED HEALTH CARE EDUCATION/TRAINING PROGRAM

## 2022-03-06 PROCEDURE — 51798 US URINE CAPACITY MEASURE: CPT

## 2022-03-06 PROCEDURE — 99024 POSTOP FOLLOW-UP VISIT: CPT | Performed by: SURGERY

## 2022-03-06 PROCEDURE — 85027 COMPLETE CBC AUTOMATED: CPT

## 2022-03-06 PROCEDURE — 700102 HCHG RX REV CODE 250 W/ 637 OVERRIDE(OP): Performed by: STUDENT IN AN ORGANIZED HEALTH CARE EDUCATION/TRAINING PROGRAM

## 2022-03-06 PROCEDURE — A9270 NON-COVERED ITEM OR SERVICE: HCPCS | Performed by: STUDENT IN AN ORGANIZED HEALTH CARE EDUCATION/TRAINING PROGRAM

## 2022-03-06 PROCEDURE — 80053 COMPREHEN METABOLIC PANEL: CPT

## 2022-03-06 RX ORDER — POTASSIUM CHLORIDE 20 MEQ/1
40 TABLET, EXTENDED RELEASE ORAL ONCE
Status: COMPLETED | OUTPATIENT
Start: 2022-03-06 | End: 2022-03-06

## 2022-03-06 RX ORDER — ACETAMINOPHEN 325 MG/1
650 TABLET ORAL EVERY 4 HOURS PRN
Qty: 30 TABLET | Refills: 0 | Status: SHIPPED | OUTPATIENT
Start: 2022-03-06

## 2022-03-06 RX ADMIN — POTASSIUM CHLORIDE 40 MEQ: 1500 TABLET, EXTENDED RELEASE ORAL at 06:20

## 2022-03-06 ASSESSMENT — ENCOUNTER SYMPTOMS
NAUSEA: 0
VOMITING: 0
ABDOMINAL PAIN: 0
MYALGIAS: 0
HEARTBURN: 0
CHILLS: 0
DIAPHORESIS: 0
CONSTIPATION: 0
FEVER: 0

## 2022-03-06 ASSESSMENT — PAIN DESCRIPTION - PAIN TYPE: TYPE: SURGICAL PAIN

## 2022-03-06 NOTE — DISCHARGE SUMMARY
Discharge Summary    CHIEF COMPLAINT ON ADMISSION  Chief Complaint   Patient presents with   • Chest Pain     Chest pain started at 1500 yesterday. He took some tums because he thought he was having indigestion without relief. Describes pain as an uncomfortable pressure. +Minor abdominal pain.        Reason for Admission  Chest Pain     Admission Date  3/2/2022    CODE STATUS  Full Code    HPI & HOSPITAL COURSE  A 77-year-old man with h/o HTN, TIA presented with chest pain that started on Tuesday while sitting in recliner. He has never experienced this in the past. Patient reports having a stress test 3 years ago which was normal. He also reports that 2 days ago he started looking at his iWatch and noticed that his heart rate was ranging from 40s to 60s which prompted him to come to the hospital.   In the ER, he was noted to have elevated LFTs. Sonogram showed cholelithiasis without any evidence of cholecystitis.    EKG showed sinus bradycardia with no significant ST-T wave changes of ischemia. Trop negative x 2.  Echo demonstrated normal left ventricular size, wall thickness, systolic, and diastolic function. EF 60%. Normal right ventricular size and systolic function. Normal left atrial size. Mild mitral annular calcification and calcification mitral apparatus.  Cardiology recommended no further cardiac work-up, optimize blood pressure.  General surgery consulted for cholelithiasis, possible stone passage. Patient underwent laparoscopic cholecystectomy on 3/4.  Patient is afebrile, hemodynamically stable. On room air. Reports some pain at surgery sites. Tolerating PO diet. WBC 8.9 normalized. K 3.5 replaced. LFTs down trending.    Therefore, he is discharged in fair and stable condition to home with close outpatient follow-up.    The patient recovered much more quickly than anticipated on admission.    Discharge Date  3/6/2022    FOLLOW UP ITEMS POST DISCHARGE  Follow up with PCP  Follow up with general  surgery    DISCHARGE DIAGNOSES  Principal Problem (Resolved):    Cholelithiasis POA: Unknown  Active Problems:    Bradycardia POA: Yes    Transaminitis POA: Unknown    Hypertension POA: Unknown    Symptomatic cholelithiasis POA: Yes  Resolved Problems:    Chest pain POA: Unknown    Hypertensive urgency POA: Unknown    Urinary retention POA: Unknown      FOLLOW UP  No future appointments.  Iam Mcghee D.O.  [address] [facility] [address]  [place] [id number] [phone]    Schedule an appointment as soon as possible for a visit in 2 weeks  For post-op check    Non Designated Pcp [facility]    In 1 week        MEDICATIONS ON DISCHARGE     Medication List      CHANGE how you take these medications      Instructions   acetaminophen 325 MG Tabs  What changed:   · medication strength  · how much to take  · when to take this  · reasons to take this  Commonly known as: Tylenol   Take 2 Tablets by mouth every four hours as needed.  Dose: 650 mg        CONTINUE taking these medications      Instructions   aspirin 81 MG EC tablet   Take 81 mg by mouth every day.  Dose: 81 mg     lisinopril 10 MG Tabs  Commonly known as: PRINIVIL   Take 1 Tablet by mouth every day.  Dose: 1 Tablet     simvastatin 40 MG Tabs  Commonly known as: ZOCOR   Take 40 mg by mouth every evening.  Dose: 40 mg     tamsulosin 0.4 MG capsule  Commonly known as: FLOMAX   Take 0.4 mg by mouth every day.  Dose: 0.4 mg     therapeutic multivitamin-minerals Tabs   Take 1 Tablet by mouth every day. Centrum Mens  Dose: 1 Tablet        STOP taking these medications    fexofenadine 60 MG Tabs  Commonly known as: ALLEGRA            Allergies  Allergies   Allergen Reactions   • Ampicillin Unspecified     Childhood allergy/ unkn reaction       DIET  Orders Placed This Encounter   Procedures   • Diet Order Diet: Cardiac     Standing Status:   Standing     Number of Occurrences:   1     Order Specific Question:   Diet:     Answer:   Cardiac [6]       ACTIVITY  As  tolerated.  Weight bearing as tolerated    CONSULTATIONS  Cardiology  General surgery    PROCEDURES  3/4: Laparoscopic cholecystectomy    LABORATORY  Lab Results   Component Value Date    SODIUM 140 03/06/2022    POTASSIUM 3.5 (L) 03/06/2022    CHLORIDE 107 03/06/2022    CO2 21 03/06/2022    GLUCOSE 90 03/06/2022    BUN 16 03/06/2022    CREATININE 0.86 03/06/2022        Lab Results   Component Value Date    WBC 8.9 03/06/2022    HEMOGLOBIN 11.4 (L) 03/06/2022    HEMATOCRIT 33.2 (L) 03/06/2022    PLATELETCT 129 (L) 03/06/2022        Total time of the discharge process exceeds 31 minutes.

## 2022-03-06 NOTE — DISCHARGE INSTRUCTIONS
Discharge Instructions    Discharged to home by car with relative. Discharged via wheelchair, hospital escort: Yes.  Special equipment needed: Not Applicable    Be sure to schedule a follow-up appointment with your primary care doctor or any specialists as instructed.     Discharge Plan:   Diet Plan: Discussed  Activity Level: Discussed  Confirmed Follow up Appointment: Patient to Call and Schedule Appointment  Confirmed Symptoms Management: Discussed  Medication Reconciliation Updated: Yes  Influenza Vaccine Indication: Not indicated: Previously immunized this influenza season and > 8 years of age    I understand that a diet low in cholesterol, fat, and sodium is recommended for good health. Unless I have been given specific instructions below for another diet, I accept this instruction as my diet prescription.   Other diet:   Gallbladder Eating Plan  If you have a gallbladder condition, you may have trouble digesting fats. Eating a low-fat diet can help reduce your symptoms, and may be helpful before and after having surgery to remove your gallbladder (cholecystectomy). Your health care provider may recommend that you work with a diet and nutrition specialist (dietitian) to help you reduce the amount of fat in your diet.  What are tips for following this plan?  General guidelines  · Limit your fat intake to less than 30% of your total daily calories. If you eat around 1,800 calories each day, this is less than 60 grams (g) of fat per day.  · Fat is an important part of a healthy diet. Eating a low-fat diet can make it hard to maintain a healthy body weight. Ask your dietitian how much fat, calories, and other nutrients you need each day.  · Eat small, frequent meals throughout the day instead of three large meals.  · Drink at least 8-10 cups of fluid a day. Drink enough fluid to keep your urine clear or pale yellow.  · Limit alcohol intake to no more than 1 drink a day for nonpregnant women and 2 drinks a day for  men. One drink equals 12 oz of beer, 5 oz of wine, or 1½ oz of hard liquor.  Reading food labels  · Check Nutrition Facts on food labels for the amount of fat per serving. Choose foods with less than 3 grams of fat per serving.  Shopping  · Choose nonfat and low-fat healthy foods. Look for the words “nonfat,” “low fat,” or “fat free.”  · Avoid buying processed or prepackaged foods.  Cooking  · Cook using low-fat methods, such as baking, broiling, grilling, or boiling.  · Cook with small amounts of healthy fats, such as olive oil, grapeseed oil, canola oil, or sunflower oil.  What foods are recommended?    · All fresh, frozen, or canned fruits and vegetables.  · Whole grains.  · Low-fat or non-fat (skim) milk and yogurt.  · Lean meat, skinless poultry, fish, eggs, and beans.  · Low-fat protein supplement powders or drinks.  · Spices and herbs.  What foods are not recommended?  · High-fat foods. These include baked goods, fast food, fatty cuts of meat, ice cream, french toast, sweet rolls, pizza, cheese bread, foods covered with butter, creamy sauces, or cheese.  · Fried foods. These include french fries, tempura, battered fish, breaded chicken, fried breads, and sweets.  · Foods with strong odors.  · Foods that cause bloating and gas.  Summary  · A low-fat diet can be helpful if you have a gallbladder condition, or before and after gallbladder surgery.  · Limit your fat intake to less than 30% of your total daily calories. This is about 60 g of fat if you eat 1,800 calories each day.  · Eat small, frequent meals throughout the day instead of three large meals.  This information is not intended to replace advice given to you by your health care provider. Make sure you discuss any questions you have with your health care provider.  Document Released: 12/23/2014 Document Revised: 04/09/2020 Document Reviewed: 01/25/2018  Elsevier Patient Education © 2020 Else[employer] Inc.      Special Instructions: None    · Is patient  "discharged on Warfarin / Coumadin?   No       Cholelithiasis    Cholelithiasis is also called \"gallstones.\" It is a kind of gallbladder disease. The gallbladder is an organ that stores a liquid (bile) that helps you digest fat. Gallstones may not cause symptoms (may be silent gallstones) until they cause a blockage, and then they can cause pain (gallbladder attack).  Follow these instructions at home:  · Take over-the-counter and prescription medicines only as told by your doctor.  · Stay at a healthy weight.  · Eat healthy foods. This includes:  ? Eating fewer fatty foods, like fried foods.  ? Eating fewer refined carbs (refined carbohydrates). Refined carbs are breads and grains that are highly processed, like white bread and white rice. Instead, choose whole grains like whole-wheat bread and brown rice.  ? Eating more fiber. Almonds, fresh fruit, and beans are healthy sources of fiber.  · Keep all follow-up visits as told by your doctor. This is important.  Contact a doctor if:  · You have sudden pain in the upper right side of your belly (abdomen). Pain might spread to your right shoulder or your chest. This may be a sign of a gallbladder attack.  · You feel sick to your stomach (are nauseous).  · You throw up (vomit).  · You have been diagnosed with gallstones that have no symptoms and you get:  ? Belly pain.  ? Discomfort, burning, or fullness in the upper part of your belly (indigestion).  Get help right away if:  · You have sudden pain in the upper right side of your belly, and it lasts for more than 2 hours.  · You have belly pain that lasts for more than 5 hours.  · You have a fever or chills.  · You keep feeling sick to your stomach or you keep throwing up.  · Your skin or the whites of your eyes turn yellow (jaundice).  · You have dark-colored pee (urine).  · You have light-colored poop (stool).  Summary  · Cholelithiasis is also called \"gallstones.\"  · The gallbladder is an organ that stores a liquid " (bile) that helps you digest fat.  · Silent gallstones are gallstones that do not cause symptoms.  · A gallbladder attack may cause sudden pain in the upper right side of your belly. Pain might spread to your right shoulder or your chest. If this happens, contact your doctor.  · If you have sudden pain in the upper right side of your belly that lasts for more than 2 hours, get help right away.  This information is not intended to replace advice given to you by your health care provider. Make sure you discuss any questions you have with your health care provider.  Document Released: 06/05/2009 Document Revised: 11/30/2018 Document Reviewed: 09/03/2017  meets Patient Education © 2020 [employer]      Laparoscopic Cholecystectomy, Care After  This sheet gives you information about how to care for yourself after your procedure. Your doctor may also give you more specific instructions. If you have problems or questions, contact your doctor.  Follow these instructions at home:  Care for cuts from surgery (incisions)    · Follow instructions from your doctor about how to take care of your cuts from surgery. Make sure you:  ? Wash your hands with soap and water before you change your bandage (dressing). If you cannot use soap and water, use hand .  ? Change your bandage as told by your doctor.  ? Leave stitches (sutures), skin glue, or skin tape (adhesive) strips in place. They may need to stay in place for 2 weeks or longer. If tape strips get loose and curl up, you may trim the loose edges. Do not remove tape strips completely unless your doctor says it is okay.  · Do not take baths, swim, or use a hot tub until your doctor says it is okay. Ask your doctor if you can take showers. You may only be allowed to take sponge baths for bathing.  · Check your surgical cut area every day for signs of infection. Check for:  ? More redness, swelling, or pain.  ? More fluid or blood.  ? Warmth.  ? Pus or a bad  smell.  Activity  · Do not drive or use heavy machinery while taking prescription pain medicine.  · Do not lift anything that is heavier than 10 lb (4.5 kg) until your doctor says it is okay.  · Do not play contact sports until your doctor says it is okay.  · Do not drive for 24 hours if you were given a medicine to help you relax (sedative).  · Rest as needed. Do not return to work or school until your doctor says it is okay.  General instructions  · Take over-the-counter and prescription medicines only as told by your doctor.  · To prevent or treat constipation while you are taking prescription pain medicine, your doctor may recommend that you:  ? Drink enough fluid to keep your pee (urine) clear or pale yellow.  ? Take over-the-counter or prescription medicines.  ? Eat foods that are high in fiber, such as fresh fruits and vegetables, whole grains, and beans.  ? Limit foods that are high in fat and processed sugars, such as fried and sweet foods.  Contact a doctor if:  · You develop a rash.  · You have more redness, swelling, or pain around your surgical cuts.  · You have more fluid or blood coming from your surgical cuts.  · Your surgical cuts feel warm to the touch.  · You have pus or a bad smell coming from your surgical cuts.  · You have a fever.  · One or more of your surgical cuts breaks open.  Get help right away if:  · You have trouble breathing.  · You have chest pain.  · You have pain that is getting worse in your shoulders.  · You faint or feel dizzy when you stand.  · You have very bad pain in your belly (abdomen).  · You are sick to your stomach (nauseous) for more than one day.  · You have throwing up (vomiting) that lasts for more than one day.  · You have leg pain.  This information is not intended to replace advice given to you by your health care provider. Make sure you discuss any questions you have with your health care provider.  Document Released: 09/26/2009 Document Revised: 11/30/2018  Document Reviewed: 06/05/2017  Guidance Software Patient Education © 2020 [employer]    Depression / Suicide Risk    As you are discharged from this [facility], it is important to learn how to keep safe from harming yourself.    Recognize the warning signs:  · Abrupt changes in personality, positive or negative- including increase in energy   · Giving away possessions  · Change in eating patterns- significant weight changes-  positive or negative  · Change in sleeping patterns- unable to sleep or sleeping all the time   · Unwillingness or inability to communicate  · Depression  · Unusual sadness, discouragement and loneliness  · Talk of wanting to die  · Neglect of personal appearance   · Rebelliousness- reckless behavior  · Withdrawal from people/activities they love  · Confusion- inability to concentrate     If you or a loved one observes any of these behaviors or has concerns about self-harm, here's what you can do:  · Talk about it- your feelings and reasons for harming yourself  · Remove any means that you might use to hurt yourself (examples: pills, rope, extension cords, firearm)  · Get professional help from the community (Mental Health, Substance Abuse, psychological counseling)  · Do not be alone:Call your Safe Contact- someone whom you trust who will be there for you.  · Call your local CRISIS HOTLINE 770-5631 or 474-545-7716  · Call your local Children's Mobile Crisis Response Team [place] (861) 611-2808 or www.[employer]  · Call the toll free National Suicide Prevention Hotlines   · National Suicide Prevention Lifeline 500-783-LBXK (3089)  · National Hope Line Network 800-SUICIDE (024-2899)        Cholelithiasis       Cholelithiasis is a form of gallbladder disease in which gallstones form in the gallbladder. The gallbladder is an organ that stores bile. Bile is made in the liver, and it helps to digest fats. Gallstones begin as small crystals and slowly grow into stones. They may  cause no symptoms until the gallbladder tightens (contracts) and a gallstone is blocking the duct (gallbladder attack), which can cause pain. Cholelithiasis is also referred to as gallstones.  There are two main types of gallstones:  · Cholesterol stones. These are made of hardened cholesterol and are usually yellow-green in color. They are the most common type of gallstone. Cholesterol is a white, waxy, fat-like substance that is made in the liver.  · Pigment stones. These are dark in color and are made of a red-yellow substance that forms when hemoglobin from red blood cells breaks down (bilirubin).  What are the causes?  This condition may be caused by an imbalance in the substances that bile is made of. This can happen if the bile:  · Has too much bilirubin.  · Has too much cholesterol.  · Does not have enough bile salts. These salts help the body absorb and digest fats.  In some cases, this condition can also be caused by the gallbladder not emptying completely or often enough.  What increases the risk?  The following factors may make you more likely to develop this condition:  · Being female.  · Having multiple pregnancies. Health care providers sometimes advise removing diseased gallbladders before future pregnancies.  · Eating a diet that is heavy in fried foods, fat, and refined carbohydrates, like white bread and white rice.  · Being obese.  · Being older than age 40.  · Prolonged use of medicines that contain female hormones (estrogen).  · Having diabetes mellitus.  · Rapidly losing weight.  · Having a family history of gallstones.  · Being of  or [place] descent.  · Having an intestinal disease such as Crohn disease.  · Having metabolic syndrome.  · Having cirrhosis.  · Having severe types of anemia such as sickle cell anemia.  What are the signs or symptoms?  In most cases, there are no symptoms. These are known as silent gallstones. If a gallstone blocks the bile ducts, it can cause a  gallbladder attack. The main symptom of a gallbladder attack is sudden pain in the upper right abdomen. The pain usually comes at night or after eating a large meal. The pain can last for one or several hours and can spread to the right shoulder or chest.  If the bile duct is blocked for more than a few hours, it can cause infection or inflammation of the gallbladder, liver, or pancreas, which may cause:  · Nausea.  · Vomiting.  · Abdominal pain that lasts for 5 hours or more.  · Fever or chills.  · Yellowing of the skin or the whites of the eyes (jaundice).  · Dark urine.  · Light-colored stools.  How is this diagnosed?  This condition may be diagnosed based on:  · A physical exam.  · Your medical history.  · An ultrasound of your gallbladder.  · CT scan.  · MRI.  · Blood tests to check for signs of infection or inflammation.  · A scan of your gallbladder and bile ducts (biliary system) using nonharmful radioactive material and special cameras that can see the radioactive material (cholescintigram). This test checks to see how your gallbladder contracts and whether bile ducts are blocked.  · Inserting a small tube with a camera on the end (endoscope) through your mouth to inspect bile ducts and check for blockages (endoscopic retrograde cholangiopancreatogram).  How is this treated?  Treatment for gallstones depends on the severity of the condition. Silent gallstones do not need treatment. If the gallstones cause a gallbladder attack or other symptoms, treatment may be required. Options for treatment include:  · Surgery to remove the gallbladder (cholecystectomy). This is the most common treatment.  · Medicines to dissolve gallstones. These are most effective at treating small gallstones. You may need to take medicines for up to 6-12 months.  · Shock wave treatment (extracorporeal biliary lithotripsy). In this treatment, an ultrasound machine sends shock waves to the gallbladder to break gallstones into smaller  pieces. These pieces can then be passed into the intestines or be dissolved by medicine. This is rarely used.  · Removing gallstones through endoscopic retrograde cholangiopancreatogram. A small basket can be attached to the endoscope and used to capture and remove gallstones.  Follow these instructions at home:  · Take over-the-counter and prescription medicines only as told by your health care provider.  · Maintain a healthy weight and follow a healthy diet. This includes:  ? Reducing fatty foods, such as fried food.  ? Reducing refined carbohydrates, like white bread and white rice.  ? Increasing fiber. Aim for foods like almonds, fruit, and beans.  · Keep all follow-up visits as told by your health care provider. This is important.  Contact a health care provider if:  · You think you have had a gallbladder attack.  · You have been diagnosed with silent gallstones and you develop abdominal pain or indigestion.  Get help right away if:  · You have pain from a gallbladder attack that lasts for more than 2 hours.  · You have abdominal pain that lasts for more than 5 hours.  · You have a fever or chills.  · You have persistent nausea and vomiting.  · You develop jaundice.  · You have dark urine or light-colored stools.  Summary  · Cholelithiasis (also called gallstones) is a form of gallbladder disease in which gallstones form in the gallbladder.  · This condition is caused by an imbalance in the substances that make up bile. This can happen if the bile has too much cholesterol, too much bilirubin, or not enough bile salts.  · You are more likely to develop this condition if you are female, pregnant, using medicines with estrogen, obese, older than age 40, or have a family history of gallstones. You may also develop gallstones if you have diabetes, an intestinal disease, cirrhosis, or metabolic syndrome.  · Treatment for gallstones depends on the severity of the condition. Silent gallstones do not need  treatment.  · If gallstones cause a gallbladder attack or other symptoms, treatment may be needed. The most common treatment is surgery to remove the gallbladder.  This information is not intended to replace advice given to you by your health care provider. Make sure you discuss any questions you have with your health care provider.  Document Released: 12/14/2006 Document Revised: 11/30/2018 Document Reviewed: 09/03/2017  Elsevier Patient Education © 2020 Else[employer] Inc.

## 2022-03-06 NOTE — PROGRESS NOTES
"    DATE: 3/6/2022    Post Operative Day  2 laparoscopic cholecystectomy.    INTERVAL EVENTS:  Tolerating diet.  Tolerable incisional pain.   LFTs, WBC, & total bili normal  Cleared to discharge home by [facility] today    REVIEW OF SYSTEMS:  Review of Systems   Constitutional: Negative for chills, diaphoresis and fever.   Cardiovascular: Negative for chest pain.   Gastrointestinal: Negative for abdominal pain, constipation, heartburn, nausea and vomiting.   Musculoskeletal: Negative for myalgias.       PHYSICAL EXAMINATION:  Vital Signs: /77   Pulse (!) 58   Temp 36.8 °C (98.2 °F) (Temporal)   Resp 16   Ht 1.727 m (5' 8\")   Wt 72.3 kg (159 lb 6.3 oz)   SpO2 93%   Physical Exam  Abdominal:      General: Bowel sounds are normal. There is no distension.      Palpations: Abdomen is soft. There is no mass.      Tenderness: There is no abdominal tenderness.      Comments: 4 lap sites well approximated, no signs of symptoms of infection         LABORATORY VALUES:   Recent Labs     03/04/22 0359 03/05/22 0358 03/06/22 0152   WBC 5.3 11.5* 8.9   RBC 3.82* 3.69* 3.56*   HEMOGLOBIN 12.3* 11.6* 11.4*   HEMATOCRIT 33.9* 32.9* 33.2*   MCV 88.7 89.2 93.3   MCH 32.2 31.4 32.0   MCHC 36.3* 35.3 34.3   RDW 38.9 39.8 42.4   PLATELETCT 124* 152* 129*   MPV 11.5 11.3 11.7     Recent Labs     03/04/22 0359 03/05/22 0358 03/06/22 0152   SODIUM 141 139 140   POTASSIUM 3.7 3.9 3.5*   CHLORIDE 110 106 107   CO2 20 20 21   GLUCOSE 87 110* 90   BUN 12 16 16   CREATININE 0.78 0.85 0.86   CALCIUM 8.3* 8.5 8.5     Recent Labs     03/04/22 0359 03/05/22 0358 03/06/22 0152   ASTSGOT 104* 123* 91*   ALTSGPT 199* 200* 162*   TBILIRUBIN 1.3 1.5 1.3   ALKPHOSPHAT 155* 127* 113*   GLOBULIN 2.0 2.2 2.2   INR 1.11  --   --      Recent Labs     03/04/22  0359   APTT 31.7   INR 1.11        IMAGING:   EC-ECHOCARDIOGRAM COMPLETE W/O CONT   Final Result      US-RUQ   Final Result      Cholelithiasis and sludge without positive sonographic " Esparza's sign or gallbladder wall thickening      Mild right pelviectasis without james hydronephrosis.      DX-CHEST-PORTABLE (1 VIEW)   Final Result      No radiographic evidence of consolidation      Mild aortic ectasia          ASSESSMENT AND PLAN:   -Cleared to discharge home today     Discussed patient condition with Patient and general surgery, Dr. Mcghee.

## 2022-03-06 NOTE — PROGRESS NOTES
Bladder scan at  0850: 750 mL after voiding 270 mL  Bladder scan at 1410:  350 mL after voiding  Bladder scan at 1600:  590 mL after voiding 225  Straight Cath 1610: 400 mL

## 2022-03-07 NOTE — PROGRESS NOTES
Pt dc'd at 1145. IV and monitor removed; monitor room notified. Pt left unit via ambulating with this RN and pt wife. Personal belongings with pt when leaving unit. Pt given discharge instructions prior to leaving unit including where to  prescriptions and when to follow-up; verbalizes understanding. Copy of discharge instructions with pt and in the chart.

## 2022-11-08 ENCOUNTER — PATIENT MESSAGE (OUTPATIENT)
Dept: HEALTH INFORMATION MANAGEMENT | Facility: OTHER | Age: 77
End: 2022-11-08

## 2024-09-27 ENCOUNTER — APPOINTMENT (OUTPATIENT)
Dept: RADIOLOGY | Facility: MEDICAL CENTER | Age: 79
End: 2024-09-27
Attending: STUDENT IN AN ORGANIZED HEALTH CARE EDUCATION/TRAINING PROGRAM
Payer: MEDICARE

## 2024-09-27 ENCOUNTER — HOSPITAL ENCOUNTER (EMERGENCY)
Facility: MEDICAL CENTER | Age: 79
End: 2024-09-27
Attending: STUDENT IN AN ORGANIZED HEALTH CARE EDUCATION/TRAINING PROGRAM
Payer: MEDICARE

## 2024-09-27 VITALS
DIASTOLIC BLOOD PRESSURE: 76 MMHG | RESPIRATION RATE: 18 BRPM | OXYGEN SATURATION: 96 % | WEIGHT: 159 LBS | BODY MASS INDEX: 24.1 KG/M2 | TEMPERATURE: 98.2 F | HEART RATE: 48 BPM | SYSTOLIC BLOOD PRESSURE: 140 MMHG | HEIGHT: 68 IN

## 2024-09-27 DIAGNOSIS — K30 INDIGESTION: ICD-10-CM

## 2024-09-27 LAB
ALBUMIN SERPL BCP-MCNC: 4.1 G/DL (ref 3.2–4.9)
ALBUMIN/GLOB SERPL: 1.6 G/DL
ALP SERPL-CCNC: 123 U/L (ref 30–99)
ALT SERPL-CCNC: 111 U/L (ref 2–50)
ANION GAP SERPL CALC-SCNC: 8 MMOL/L (ref 7–16)
AST SERPL-CCNC: 206 U/L (ref 12–45)
BASOPHILS # BLD AUTO: 0.3 % (ref 0–1.8)
BASOPHILS # BLD: 0.02 K/UL (ref 0–0.12)
BILIRUB SERPL-MCNC: 0.7 MG/DL (ref 0.1–1.5)
BUN SERPL-MCNC: 16 MG/DL (ref 8–22)
CALCIUM ALBUM COR SERPL-MCNC: 8.8 MG/DL (ref 8.5–10.5)
CALCIUM SERPL-MCNC: 8.9 MG/DL (ref 8.5–10.5)
CHLORIDE SERPL-SCNC: 109 MMOL/L (ref 96–112)
CO2 SERPL-SCNC: 23 MMOL/L (ref 20–33)
CREAT SERPL-MCNC: 0.86 MG/DL (ref 0.5–1.4)
EKG IMPRESSION: NORMAL
EOSINOPHIL # BLD AUTO: 0.06 K/UL (ref 0–0.51)
EOSINOPHIL NFR BLD: 0.9 % (ref 0–6.9)
ERYTHROCYTE [DISTWIDTH] IN BLOOD BY AUTOMATED COUNT: 38.4 FL (ref 35.9–50)
GFR SERPLBLD CREATININE-BSD FMLA CKD-EPI: 89 ML/MIN/1.73 M 2
GLOBULIN SER CALC-MCNC: 2.5 G/DL (ref 1.9–3.5)
GLUCOSE SERPL-MCNC: 143 MG/DL (ref 65–99)
HCT VFR BLD AUTO: 37 % (ref 42–52)
HGB BLD-MCNC: 13.1 G/DL (ref 14–18)
IMM GRANULOCYTES # BLD AUTO: 0.03 K/UL (ref 0–0.11)
IMM GRANULOCYTES NFR BLD AUTO: 0.5 % (ref 0–0.9)
LYMPHOCYTES # BLD AUTO: 0.87 K/UL (ref 1–4.8)
LYMPHOCYTES NFR BLD: 13.6 % (ref 22–41)
MCH RBC QN AUTO: 31.9 PG (ref 27–33)
MCHC RBC AUTO-ENTMCNC: 35.4 G/DL (ref 32.3–36.5)
MCV RBC AUTO: 90 FL (ref 81.4–97.8)
MONOCYTES # BLD AUTO: 0.3 K/UL (ref 0–0.85)
MONOCYTES NFR BLD AUTO: 4.7 % (ref 0–13.4)
NEUTROPHILS # BLD AUTO: 5.13 K/UL (ref 1.82–7.42)
NEUTROPHILS NFR BLD: 80 % (ref 44–72)
NRBC # BLD AUTO: 0 K/UL
NRBC BLD-RTO: 0 /100 WBC (ref 0–0.2)
NT-PROBNP SERPL IA-MCNC: 181 PG/ML (ref 0–125)
PLATELET # BLD AUTO: 127 K/UL (ref 164–446)
PMV BLD AUTO: 11.1 FL (ref 9–12.9)
POTASSIUM SERPL-SCNC: 3.6 MMOL/L (ref 3.6–5.5)
PROT SERPL-MCNC: 6.6 G/DL (ref 6–8.2)
RBC # BLD AUTO: 4.11 M/UL (ref 4.7–6.1)
SODIUM SERPL-SCNC: 140 MMOL/L (ref 135–145)
TROPONIN T SERPL-MCNC: 12 NG/L (ref 6–19)
TROPONIN T SERPL-MCNC: 9 NG/L (ref 6–19)
WBC # BLD AUTO: 6.4 K/UL (ref 4.8–10.8)

## 2024-09-27 PROCEDURE — 84484 ASSAY OF TROPONIN QUANT: CPT

## 2024-09-27 PROCEDURE — 93005 ELECTROCARDIOGRAM TRACING: CPT | Performed by: STUDENT IN AN ORGANIZED HEALTH CARE EDUCATION/TRAINING PROGRAM

## 2024-09-27 PROCEDURE — 93005 ELECTROCARDIOGRAM TRACING: CPT

## 2024-09-27 PROCEDURE — 99285 EMERGENCY DEPT VISIT HI MDM: CPT

## 2024-09-27 PROCEDURE — 71045 X-RAY EXAM CHEST 1 VIEW: CPT

## 2024-09-27 PROCEDURE — 83880 ASSAY OF NATRIURETIC PEPTIDE: CPT

## 2024-09-27 PROCEDURE — 700102 HCHG RX REV CODE 250 W/ 637 OVERRIDE(OP): Performed by: STUDENT IN AN ORGANIZED HEALTH CARE EDUCATION/TRAINING PROGRAM

## 2024-09-27 PROCEDURE — 36415 COLL VENOUS BLD VENIPUNCTURE: CPT

## 2024-09-27 PROCEDURE — 80053 COMPREHEN METABOLIC PANEL: CPT

## 2024-09-27 PROCEDURE — 85025 COMPLETE CBC W/AUTO DIFF WBC: CPT

## 2024-09-27 PROCEDURE — A9270 NON-COVERED ITEM OR SERVICE: HCPCS | Performed by: STUDENT IN AN ORGANIZED HEALTH CARE EDUCATION/TRAINING PROGRAM

## 2024-09-27 RX ADMIN — LIDOCAINE HYDROCHLORIDE 30 ML: 20 SOLUTION OROPHARYNGEAL at 17:31

## 2024-09-27 ASSESSMENT — HEART SCORE
RISK FACTORS: 1-2 RISK FACTORS
HISTORY: MODERATELY SUSPICIOUS
ECG: NORMAL
AGE: 65+
TROPONIN: LESS THAN OR EQUAL TO NORMAL LIMIT
HEART SCORE: 4

## 2024-09-27 NOTE — ED TRIAGE NOTES
"Chief Complaint   Patient presents with    Chest Pain     PT reports CP since 1200 pm today. PT reports it is constant and dull in nature. PT recently recovered form covid.        BIB EMS to [facility], pt on monitor, provided call bell and in gown, labs drawn and sent.    Medications given en route: 2mg morphine, 4mg zofran    BP (!) 193/84   Pulse 61   Temp 36.7 °C (98 °F) (Oral)   Resp 16   Ht 1.727 m (5' 8\")   Wt 72.1 kg (159 lb)   SpO2 100%   BMI 24.18 kg/m²     "

## 2024-09-28 NOTE — ED NOTES
Assumed care of patient, patient bedside report received from JULY Salomon . Pt AAO X 4 , respirations even and unlabored, on room air . Introduced self as pt RN, POC discussed, call light in reach, Fall risk interventions in place.

## 2024-09-28 NOTE — ED PROVIDER NOTES
ER Provider Note    Scribed for Dr. Mauricio Shelby MD. by Jen Fuller. 9/27/2024  5:21 PM    Primary Care Provider: No primary care provider  CHIEF COMPLAINT  Chief Complaint   Patient presents with    Chest Pain     PT reports CP since 1200 pm today. PT reports it is constant and dull in nature. PT recently recovered form covid.        EXTERNAL RECORDS REVIEWED  Outpatient labs & studies 3/2/2022 Echo showed an EF of 60% and normal diastolic function.    HPI/ROS  LIMITATION TO HISTORY   Select: : None    OUTSIDE HISTORIAN(S):  Significant other Wife provides additional information to the history of present illness.    Ton Christine is a 79 y.o. male who presents to the ED via for chest pain onset 4 hours ago. Wife reports that the patient has just got over a COVID illness. Today was the first day that he has been able to eat a lot of solid foods, since he was previously sick. Wife reports after eating lunch around 1 PM the patient had chest pain, no shortness of breath, that was exacerbated when he laid down and is constant and dull in nature, and it did not radiate. He was diaphoretic and nauseous, reporting to his wife he needed to vomit. He had two Aspirin before the patient was picked up by EMS. The pain went away when EMS gave the patient Morphine and Zofran. Patient denies heart issues such as heart attack or stent placement.     PAST MEDICAL HISTORY  Past Medical History:   Diagnosis Date    Hypertension     Hypertensive urgency 3/3/2022       SURGICAL HISTORY  Past Surgical History:   Procedure Laterality Date    KO BY LAPAROSCOPY N/A 3/4/2022    Procedure: CHOLECYSTECTOMY, LAPAROSCOPIC;  Surgeon: Iam Mcghee D.O.;  Location: SURGERY [facility];  Service: General       FAMILY HISTORY  History reviewed. No pertinent family history.    SOCIAL HISTORY   reports that he has never smoked. He has never used smokeless tobacco. He reports that he does not currently use alcohol. He reports that he does  "not use drugs.    CURRENT MEDICATIONS  Previous Medications    ACETAMINOPHEN (TYLENOL) 325 MG TAB    Take 2 Tablets by mouth every four hours as needed.    ASPIRIN 81 MG EC TABLET    Take 81 mg by mouth every day.    SIMVASTATIN (ZOCOR) 40 MG TAB    Take 40 mg by mouth every evening.    THERAPEUTIC MULTIVITAMIN-MINERALS (THERAGRAN-M) TAB    Take 1 Tablet by mouth every day. Centrum Mens       ALLERGIES  Ampicillin    PHYSICAL EXAM  BP (!) 193/84   Pulse 61   Temp 36.7 °C (98 °F) (Oral)   Resp 16   Ht 1.727 m (5' 8\")   Wt 72.1 kg (159 lb)   SpO2 100%   BMI 24.18 kg/m²   Physical Exam  Vitals and nursing note reviewed.   Constitutional:       Appearance: He is well-developed.   HENT:      Head: Normocephalic.   Cardiovascular:      Rate and Rhythm: Normal rate and regular rhythm.      Heart sounds: No murmur heard.  Pulmonary:      Effort: Pulmonary effort is normal.      Breath sounds: Normal breath sounds.   Abdominal:      Palpations: Abdomen is soft.      Tenderness: There is no abdominal tenderness.   Musculoskeletal:         General: Normal range of motion.      Right lower leg: No edema.      Left lower leg: No edema.   Skin:     General: Skin is warm.   Neurological:      General: No focal deficit present.      Mental Status: He is alert and oriented to person, place, and time.       DIAGNOSTIC STUDIES & PROCEDURES    Labs:   Results for orders placed or performed during the hospital encounter of 09/27/24   CBC with Differential   Result Value Ref Range    WBC 6.4 4.8 - 10.8 K/uL    RBC 4.11 (L) 4.70 - 6.10 M/uL    Hemoglobin 13.1 (L) 14.0 - 18.0 g/dL    Hematocrit 37.0 (L) 42.0 - 52.0 %    MCV 90.0 81.4 - 97.8 fL    MCH 31.9 27.0 - 33.0 pg    MCHC 35.4 32.3 - 36.5 g/dL    RDW 38.4 35.9 - 50.0 fL    Platelet Count 127 (L) 164 - 446 K/uL    MPV 11.1 9.0 - 12.9 fL    Neutrophils-Polys 80.00 (H) 44.00 - 72.00 %    Lymphocytes 13.60 (L) 22.00 - 41.00 %    Monocytes 4.70 0.00 - 13.40 %    Eosinophils 0.90 " 0.00 - 6.90 %    Basophils 0.30 0.00 - 1.80 %    Immature Granulocytes 0.50 0.00 - 0.90 %    Nucleated RBC 0.00 0.00 - 0.20 /100 WBC    Neutrophils (Absolute) 5.13 1.82 - 7.42 K/uL    Lymphs (Absolute) 0.87 (L) 1.00 - 4.80 K/uL    Monos (Absolute) 0.30 0.00 - 0.85 K/uL    Eos (Absolute) 0.06 0.00 - 0.51 K/uL    Baso (Absolute) 0.02 0.00 - 0.12 K/uL    Immature Granulocytes (abs) 0.03 0.00 - 0.11 K/uL    NRBC (Absolute) 0.00 K/uL   Complete Metabolic Panel (CMP)   Result Value Ref Range    Sodium 140 135 - 145 mmol/L    Potassium 3.6 3.6 - 5.5 mmol/L    Chloride 109 96 - 112 mmol/L    Co2 23 20 - 33 mmol/L    Anion Gap 8.0 7.0 - 16.0    Glucose 143 (H) 65 - 99 mg/dL    Bun 16 8 - 22 mg/dL    Creatinine 0.86 0.50 - 1.40 mg/dL    Calcium 8.9 8.5 - 10.5 mg/dL    Correct Calcium 8.8 8.5 - 10.5 mg/dL    AST(SGOT) 206 (H) 12 - 45 U/L    ALT(SGPT) 111 (H) 2 - 50 U/L    Alkaline Phosphatase 123 (H) 30 - 99 U/L    Total Bilirubin 0.7 0.1 - 1.5 mg/dL    Albumin 4.1 3.2 - 4.9 g/dL    Total Protein 6.6 6.0 - 8.2 g/dL    Globulin 2.5 1.9 - 3.5 g/dL    A-G Ratio 1.6 g/dL   proBrain Natriuretic Peptide, NT (BNP)   Result Value Ref Range    NT-proBNP 181 (H) 0 - 125 pg/mL   Troponins NOW   Result Value Ref Range    Troponin T 9 6 - 19 ng/L   Troponins in two (2) hours   Result Value Ref Range    Troponin T 12 6 - 19 ng/L   ESTIMATED GFR   Result Value Ref Range    GFR (CKD-EPI) 89 >60 mL/min/1.73 m 2   EKG   Result Value Ref Range    Report       [facility] Emergency Dept.    Test Date:  2024  Pt Name:    MARTIR FAYE               Department: ER  MRN:        [id number]                      Room:        22  Gender:     Male                         Technician: [id number]  :        [id number]                   Requested By:ER TRIAGE PROTOCOL  Order #:    [id number]                    Garima MD: Mauricio Shelby    Measurements  Intervals                                Axis  Rate:       60                            P:          57  AZ:         164                          QRS:        15  QRSD:       97                           T:          54  QT:         447  QTc:        447    Interpretive Statements  Sinus rhythm  Compared to ECG 03/02/2022 01:17:09  Sinus bradycardia no longer present  Electronically Signed On 09- 17:05:57 PDT by Mauricio Shelby      All labs reviewed by me.    EKG:   I have independently interpreted this EKG     The cardiac monitor revealed sinus rhtym as interpreted by me. The cardiac monitor was ordered secondary to the patient’s complaint of chest pain and to monitor the patient for dysrhythmia    Radiology:   The attending Emergency Physician has independently interpreted the diagnostic imaging associated with this visit and is awaiting the final reading from the radiologist, which will be displayed below.    Preliminary interpretation is a follows: No acute abnormalities    Radiologist interpretation:    DX-CHEST-PORTABLE (1 VIEW)   Final Result      No acute cardiopulmonary abnormality.              COURSE & MEDICAL DECISION MAKING    INITIAL ASSESSMENT AND PLAN  Care Narrative:   CHEST PAIN:   HEART Score for Major Cardiac Events  HEART Score     History: Moderately suspicious  ECG: Normal  Age: 65+  Risk Factors: 1-2 risk factors  Troponin: Less than or equal to normal limit    Heart Score: 4    Total Score   0-3 Points = Low Score, risk of MACE 0.9-1.7%.  4-6 Points = Moderate Score, risk of MACE 12-16.6%  7-10 Points = High Score, risk of MACE 50-65%      5:21 PM - Patient seen and evaluated at bedside.  79-year-old male presenting with chest pain that began earlier tonight.  Somewhat typical in nature however also confounded by eating rich foods after recent GI illness and patient also having some epigastric tenderness.  Will obtain additional ACS evaluation EKG obtained at triage is nonischemic at this time.  Patient still with mild epigastric pain.  Will administer GI cocktail and  reassess    8:40 PM - Patient reevaluated at bedside. He reports feeling improved.  Symptoms have resolved at this time after GI cocktail.  Pending repeat troponin however initial was negative making ACS unlikely.    10:02 PM  -patient again reassessed and made aware of negative overall workup.  Repeat troponin is also negative making ACS extremely unlikely at this time.  The patient's heart score is 4.  I suspect his symptoms are likely more related to indigestion given resolution with GI cocktail, somewhat atypical symptoms for ACS and reassuring workup.  I feel he is safe for plan of discharge home with cardiology follow-up as an outpatient.  Discussed plan for discharge; I advised the patient to return to the [facility] ED with any new or worsening symptoms. Patient was given the opportunity to ask any questions. I addressed all questions or concerns and the patient is stable for discharge at this time. Patient verbalizes understanding and agreement to this plan of care.             ADDITIONAL PROBLEM LIST AND DISPOSITION  Past Medical History:   Diagnosis Date    Hypertension     Hypertensive urgency 3/3/2022                    DISPOSITION AND DISCUSSIONS  I have discussed management of the patient with the following physicians and KARLIE's: None    Discussion of management with other QHP or appropriate source(s): None     Escalation of care considered, and ultimately not performed: the patient was evaluated by myself, after discussion I have recommended the patient to be discharged.    Barriers to care at this time, including but not limited to: Patient does not have established PCP.     Decision tools and prescription drugs considered including, but not limited to:  HEART Score 4 .    The patient will return for new or worsening symptoms and is stable at the time of discharge.    The patient is referred to a primary physician for blood pressure management, diabetic screening, and for all other preventative health  concerns.    DISPOSITION:  Patient will be discharged home in stable condition.    FOLLOW UP:  [facility], Emergency Dept  [address] [address] [phone]        FINAL IMPRESSION   1. Indigestion       Jen MARQUEZ (Scribe), am scribing for, and in the presence of, Mauricio Shelby M.D..    Electronically signed by: Jen Fuller (Scribe), 9/27/2024    Mauricio MARQUEZ M.D. personally performed the services described in this documentation, as scribed by Jen Fuller in my presence, and it is both accurate and complete.    The note accurately reflects work and decisions made by me.  Mauricio Shelby M.D.  9/27/2024  10:53 PM

## 2024-09-28 NOTE — ED NOTES
Pt discharged with wife. GCS 15. IV discontinued and gauze placed, pt in possession of belongings. Pt provided discharge education and information pertaining to medications and follow up appointments. Pt received copy of discharge instructions and verbalized understanding.     Vitals:    09/27/24 2100   BP: (!) 140/76   Pulse: (!) 48   Resp: 18   Temp: 36.8 °C (98.2 °F)   SpO2: 96%

## 2025-04-08 ENCOUNTER — HOSPITAL ENCOUNTER (OUTPATIENT)
Dept: RADIOLOGY | Facility: MEDICAL CENTER | Age: 80
End: 2025-04-08
Payer: MEDICARE

## 2025-04-08 ENCOUNTER — OFFICE VISIT (OUTPATIENT)
Dept: URGENT CARE | Facility: CLINIC | Age: 80
End: 2025-04-08
Payer: MEDICARE

## 2025-04-08 VITALS
RESPIRATION RATE: 18 BRPM | HEART RATE: 56 BPM | DIASTOLIC BLOOD PRESSURE: 82 MMHG | BODY MASS INDEX: 24.25 KG/M2 | TEMPERATURE: 98 F | WEIGHT: 160 LBS | HEIGHT: 68 IN | SYSTOLIC BLOOD PRESSURE: 144 MMHG | OXYGEN SATURATION: 98 %

## 2025-04-08 DIAGNOSIS — R10.31 RIGHT INGUINAL PAIN: Primary | ICD-10-CM

## 2025-04-08 DIAGNOSIS — R10.31 RIGHT INGUINAL PAIN: ICD-10-CM

## 2025-04-08 DIAGNOSIS — S76.211A: ICD-10-CM

## 2025-04-08 PROCEDURE — 3077F SYST BP >= 140 MM HG: CPT

## 2025-04-08 PROCEDURE — 3079F DIAST BP 80-89 MM HG: CPT

## 2025-04-08 PROCEDURE — 99204 OFFICE O/P NEW MOD 45 MIN: CPT

## 2025-04-08 PROCEDURE — 76857 US EXAM PELVIC LIMITED: CPT

## 2025-04-08 RX ORDER — LISINOPRIL 10 MG/1
1 TABLET ORAL DAILY
COMMUNITY
Start: 2024-06-07 | End: 2025-06-02

## 2025-04-08 ASSESSMENT — ENCOUNTER SYMPTOMS
EYE PAIN: 0
VOMITING: 0
EYE REDNESS: 0
STRIDOR: 0
FLANK PAIN: 0
HEARTBURN: 0
SHORTNESS OF BREATH: 0
COUGH: 0
SPUTUM PRODUCTION: 0
ABDOMINAL PAIN: 1
FEVER: 0
CHILLS: 0
DIZZINESS: 0
WHEEZING: 0
CONSTIPATION: 0
NAUSEA: 0
BLOOD IN STOOL: 0
MYALGIAS: 0
PALPITATIONS: 0
DIARRHEA: 0
HEADACHES: 0
SORE THROAT: 0
EYE DISCHARGE: 0

## 2025-04-08 ASSESSMENT — FIBROSIS 4 INDEX: FIB4 SCORE: 12.01

## 2025-04-09 ENCOUNTER — RESULTS FOLLOW-UP (OUTPATIENT)
Dept: URGENT CARE | Facility: CLINIC | Age: 80
End: 2025-04-09

## 2025-04-09 DIAGNOSIS — K40.20 BILATERAL INGUINAL HERNIA WITHOUT OBSTRUCTION OR GANGRENE, RECURRENCE NOT SPECIFIED: Primary | ICD-10-CM

## 2025-04-09 NOTE — PROGRESS NOTES
Subjective:   Ton Christine is a 80 y.o. male who presents for Abdominal Pain (RLQ pain lifted something heavy and has buldge)          I introduced myself to the patient and informed them that I am a Family Nurse Practitioner.    HPI: Ton is a 80-year-old male who appears younger, who comes in today c/o feeling some mild pain and pressure in his right inguinal area. Onset was about a week ago, he was lifting a heavy gate while repairing a chicken coop, felt something pop in his right groin/right lower abdomen.  States he has been having intermittent feelings of pressure and discomfort in that area.  Patient describes symptoms as intermittent. They describe the pain as pressure. Aggravating factors include lifting, pushing, pulling anything heavy. Relieving factors include lying down flat on his back. Treatments tried at home include he has been wearing an abdominal support band with some relief. They describe their symptoms as mild.  States he came in today as he is concerned that he may have given himself a hernia. Denies any F/C/N/V/D, urinary symptoms, scrotal or testicular pain or swelling, perineal pain, hematuria, diaphoresis.  No previous history of hernia in the past        Review of Systems   Constitutional:  Negative for chills, fever and malaise/fatigue.   HENT:  Negative for congestion, ear pain and sore throat.    Eyes:  Negative for pain, discharge and redness.   Respiratory:  Negative for cough, sputum production, shortness of breath, wheezing and stridor.    Cardiovascular:  Negative for chest pain and palpitations.   Gastrointestinal:  Positive for abdominal pain. Negative for blood in stool, constipation, diarrhea, heartburn, nausea and vomiting.   Genitourinary:  Negative for dysuria, flank pain, frequency, hematuria and urgency.   Musculoskeletal:  Negative for myalgias.   Skin:  Negative for rash.   Neurological:  Negative for dizziness and headaches.       Medications:  "acetaminophen Tabs  aspirin  lisinopril Tabs  simvastatin Tabs  therapeutic multivitamin-minerals Tabs     Allergies: Ampicillin    Problem List: does not have any pertinent problems on file.    Surgical History:  Past Surgical History:   Procedure Laterality Date    KO BY LAPAROSCOPY N/A 3/4/2022    Procedure: CHOLECYSTECTOMY, LAPAROSCOPIC;  Surgeon: Iam Mcghee D.O.;  Location: SURGERY [facility];  Service: General       Past Social Hx:   reports that he has never smoked. He has never used smokeless tobacco. He reports that he does not currently use alcohol. He reports that he does not use drugs.     Past Family Hx:   family history is not on file.     Problem list, medications, and allergies reviewed by myself today in Epic.   I have documented what I find to be significant in regards to past medical, social, family and surgical history  in my HPI or under PMH/PSH/FH review section, otherwise it is noncontributory     Objective:     BP (!) 144/82 (BP Location: Left arm, Patient Position: Sitting, BP Cuff Size: Adult)   Pulse (!) 56   Temp 36.7 °C (98 °F) (Temporal)   Resp 18   Ht 1.727 m (5' 8\")   Wt 72.6 kg (160 lb)   SpO2 98%   BMI 24.33 kg/m²     During this visit, appropriate PPE was worn, and hand hygiene was performed.    Physical Exam  Vitals reviewed.   Constitutional:       General: He is not in acute distress.     Appearance: Normal appearance. He is not ill-appearing or toxic-appearing.   HENT:      Head: Normocephalic and atraumatic.      Right Ear: External ear normal.      Left Ear: External ear normal.      Nose: No congestion or rhinorrhea.   Eyes:      General: No scleral icterus.        Right eye: No discharge.         Left eye: No discharge.      Extraocular Movements: Extraocular movements intact.      Conjunctiva/sclera: Conjunctivae normal.   Cardiovascular:      Rate and Rhythm: Normal rate.   Pulmonary:      Effort: Pulmonary effort is normal.   Abdominal:      General: " Abdomen is flat. Bowel sounds are normal. There is no distension.      Palpations: Abdomen is soft. There is no hepatomegaly, splenomegaly or mass.      Tenderness: There is abdominal tenderness. There is no right CVA tenderness, left CVA tenderness, guarding or rebound.      Hernia: No hernia is present.          Comments: There is no obvious inguinal hernia on exam, and I do not palpate an obvious hernia with patient performing Valsalva maneuver.  He has very mild TTP to the right inguinal area.  There is no overlying erythema, edema, induration, warmth to touch or any other sign of infection.   Musculoskeletal:         General: Normal range of motion.      Cervical back: Normal range of motion. No rigidity.      Right lower leg: No edema.      Left lower leg: No edema.   Skin:     General: Skin is warm and dry.      Capillary Refill: Capillary refill takes 2 to 3 seconds.      Coloration: Skin is not jaundiced.   Neurological:      General: No focal deficit present.      Mental Status: He is alert and oriented to person, place, and time. Mental status is at baseline.      Gait: Gait normal.   Psychiatric:         Mood and Affect: Mood normal.         Behavior: Behavior normal.         Thought Content: Thought content normal.         Judgment: Judgment normal.         Assessment/Plan:     Diagnosis and associated orders:     1. Right inguinal pain  US-INGUINAL HERNIA      2. Strain of right inguinal region           Comments/MDM:     1. Right inguinal pain (Primary)  - US-INGUINAL HERNIA; Future    2. Strain of right inguinal region  Patient's presentation and symptoms as well as physical exam are consistent with right inguinal strain.  I discussed with him I do not see any obvious indications of an inguinal hernia on exam, even with him performing Valsalva maneuver.  He does have some mild TTP to the right inguinal area, otherwise is asymptomatic.  Discussed with patient we will order stat inguinal ultrasound to  rule out hernia, have a was able to call and make the appointment let patient know.  I discussed with him I will notify him of the results via MyChart, if ultrasound is positive for hernia we will refer him to general surgery for consult.  He states he understands  Supportive care measures discussed including avoiding any heavy lifting, pushing, pulling  discussed with patient Dx,  DDx, management options (risks,benefits, and alternatives to planned treatment), natural progression.   Supportive care measures were discussed.   Questions were encouraged and answered.   Written information was provided and I did go over this with the patient in clinic today.  Instructed patient regarding red flags and to return to urgent care prn if new or worsening sx or if there is no improvement in condition prn.    Advised the patient to follow-up with the primary care physician for recheck, reevaluation, and consideration of further management.  I did instruct patient regarding medications prescribed, purpose, side effects, precautions.  Instructed patient to get a pharmacy consult when picking up any prescribed medications.  Strict ER precautions discussed for any worsening symptoms, sudden abdominal pain, sweating, fever, chills, nausea or vomiting, lethargy, urinary symptoms   Patient states they have good understanding and they are agreeable with the plan of care.             Pt is clinically stable at today's acute urgent care visit. Vital signs are normal and reassuring.  No acute distress noted. Appropriate for outpatient management at this time.        I personally reviewed prior external notes and test results pertinent to today's visit.  I have independently reviewed and interpreted all diagnostics ordered during this urgent care acute visit.        Please note that this dictation was created using voice recognition software. I have made a reasonable attempt to correct obvious errors, but I expect that there are errors  of grammar and possibly content that I did not discover before finalizing the note.    This note was electronically signed by Zacarias SANTILLAN, ELDER, KYEON, ULISES

## 2025-04-09 NOTE — TELEPHONE ENCOUNTER
USS result reviewed, does show evidence of bilateral reducible inguinal hernias, urgent referral made to general surgery, patient notified via MyChart.

## 2025-05-07 ENCOUNTER — OFFICE VISIT (OUTPATIENT)
Facility: MEDICAL CENTER | Age: 80
End: 2025-05-07
Payer: MEDICARE

## 2025-05-07 VITALS
TEMPERATURE: 98 F | HEIGHT: 68 IN | DIASTOLIC BLOOD PRESSURE: 78 MMHG | BODY MASS INDEX: 24.14 KG/M2 | SYSTOLIC BLOOD PRESSURE: 142 MMHG | HEART RATE: 59 BPM | WEIGHT: 159.28 LBS | OXYGEN SATURATION: 99 %

## 2025-05-07 DIAGNOSIS — K40.20 NON-RECURRENT BILATERAL INGUINAL HERNIA WITHOUT OBSTRUCTION OR GANGRENE: ICD-10-CM

## 2025-05-07 PROCEDURE — 3077F SYST BP >= 140 MM HG: CPT | Performed by: SURGERY

## 2025-05-07 PROCEDURE — 99203 OFFICE O/P NEW LOW 30 MIN: CPT | Performed by: SURGERY

## 2025-05-07 PROCEDURE — 3078F DIAST BP <80 MM HG: CPT | Performed by: SURGERY

## 2025-05-07 RX ORDER — CHLORAL HYDRATE 500 MG
CAPSULE ORAL
COMMUNITY

## 2025-05-07 RX ORDER — UBIDECARENONE 100 MG
CAPSULE ORAL
COMMUNITY

## 2025-05-07 ASSESSMENT — FIBROSIS 4 INDEX: FIB4 SCORE: 12.01

## 2025-05-07 NOTE — PROGRESS NOTES
Subjective:   5/7/2025  9:13 AM  Primary care physician:Pcp Pt States None      Chief Complaint:   Chief Complaint   Patient presents with    New Patient     Bilateral inguinal hernia        History of presenting illness:  Ton Christine  is a pleasant 80 y.o. year old male who presented with bilateral inguinal hernias.  Right side is symptomatic and causing ongoing discomfort.  He has been wearing a truss for support but still notes pain.  He has no piror hx of hemia repair and takes ASA 81mg a day, but no other blood thinners.  He has a past hx of lap julia as well.      Imaging Reveals bilateral inguinal hernias.    Past Medical History:   Diagnosis Date    Hypertension     Hypertensive urgency 3/3/2022     Past Surgical History:   Procedure Laterality Date    JULIA BY LAPAROSCOPY N/A 3/4/2022    Procedure: CHOLECYSTECTOMY, LAPAROSCOPIC;  Surgeon: Iam Mcghee D.O.;  Location: SURGERY [facility];  Service: General     Allergies   Allergen Reactions    Ampicillin Unspecified     Childhood allergy/ unkn reaction     Current Outpatient Medications   Medication Sig    Coenzyme Q10 100 MG Cap Take  by mouth.    Omega-3 1000 MG Cap Take  by mouth.    lisinopril (PRINIVIL) 10 MG Tab Take 1 Tablet by mouth every day.    acetaminophen (TYLENOL) 325 MG Tab Take 2 Tablets by mouth every four hours as needed.    aspirin 81 MG EC tablet Take 81 mg by mouth every day.    simvastatin (ZOCOR) 40 MG Tab Take 40 mg by mouth every evening.    therapeutic multivitamin-minerals (THERAGRAN-M) Tab Take 1 Tablet by mouth every day. Centrum Mens     Social History     Socioeconomic History    Marital status:      Spouse name: Not on file    Number of children: Not on file    Years of education: Not on file    Highest education level: Not on file   Occupational History    Not on file   Tobacco Use    Smoking status: Never    Smokeless tobacco: Never   Vaping Use    Vaping status: Never Used   Substance and Sexual Activity     "Alcohol use: Not Currently    Drug use: Never    Sexual activity: Not on file   Other Topics Concern    Not on file   Social History Narrative    Not on file     Social Drivers of Health     Financial Resource Strain: Not on file   Food Insecurity: Not on file   Transportation Needs: Not on file   Physical Activity: Not on file   Stress: Not on file   Social Connections: Not on file   Intimate Partner Violence: Not on file   Housing Stability: Not on file      History reviewed. No pertinent family history.    ROS: Negative except as detailed in HPI.     Objective:   BP (!) 142/78 (BP Location: Left arm, Patient Position: Sitting, BP Cuff Size: Adult)   Pulse (!) 59   Temp 36.7 °C (98 °F) (Temporal)   Ht 1.727 m (5' 8\")   Wt 72.3 kg (159 lb 4.5 oz)   SpO2 99%   BMI 24.22 kg/m²     Physical Exam:  Constitutional: Well-developed and well-nourished. Not diaphoretic. No distress.   Skin: Skin is warm and dry. No rash noted.  Head: Atraumatic without lesions.  Eyes: Conjunctivae and extraocular motions are normal. No scleral icterus.   Nose: Nares patent. Septum midline. No discharge.   Neck: Supple, trachea midline. Normal range of motion. No thyromegaly present. No lymphadenopathy--cervical or supraclavicular.  Cardiovascular: Regular rate and rhythm, 2+ pulses   Lungs: Effort normal. No chest wall masses.   Abdomen: Soft, non tender, and without distention. No rebound, guarding, masses.    Bilateral small reducible inguinal hernias present  Extremities: Warm and well perfused, no pitting edema  Musculoskeletal: All major joints AROM full in all directions without pain.  Neurological: Alert and oriented x 3.   Psychiatric:  Behavior, mood, and affect are appropriate.        Diagnosis:     Assessment & Plan  Non-recurrent bilateral inguinal hernia without obstruction or gangrene               Medical Decision Making:  Today's Assessment / Status / Plan:     80 y.o. year old male with bilateral inguinal hernia.  We " discussed repair of the hernia(s) with a minimally invasive approach.  We also discussed possible need for open surgery and risks factors for recurrence including heavy lifting or straining, obesity, and/or smoking.  Risks/benefits/alternatives of planned surgery were discussed with the patient.  They understand issues involved and agree with the surgical treatment plan.  Will arrange for surgery at the next available time.      Robotic Jose Juan IHR with [employer]

## 2025-05-08 ENCOUNTER — TELEPHONE (OUTPATIENT)
Facility: MEDICAL CENTER | Age: 80
End: 2025-05-08
Payer: MEDICARE

## 2025-06-03 ENCOUNTER — APPOINTMENT (OUTPATIENT)
Dept: ADMISSIONS | Facility: MEDICAL CENTER | Age: 80
End: 2025-06-03
Attending: SURGERY
Payer: MEDICARE

## 2025-06-09 ENCOUNTER — PRE-ADMISSION TESTING (OUTPATIENT)
Dept: ADMISSIONS | Facility: MEDICAL CENTER | Age: 80
End: 2025-06-09
Attending: SURGERY
Payer: MEDICARE

## 2025-06-09 RX ORDER — LISINOPRIL 10 MG/1
10 TABLET ORAL EVERY EVENING
COMMUNITY
Start: 2025-06-03 | End: 2026-05-29

## 2025-06-09 NOTE — PREADMIT AVS NOTE
Current Medications   Medication Instructions    lisinopril (PRINIVIL) 10 MG Tab Stop 24 hours before surgery    Coenzyme Q10 100 MG Cap Stop 7 days before surgery    Mead-3 1000 MG Cap Stop 7 days before surgery    acetaminophen (TYLENOL) 325 MG Tab Continue taking medication as prescribed, including morning of procedure     aspirin 81 MG EC tablet Follow instructions from surgeon or specialist.    simvastatin (ZOCOR) 40 MG Tab Continue taking as prescribed.    therapeutic multivitamin-minerals (THERAGRAN-M) Tab Stop 7 days before surgery   BPH OTC supplement- Hold 7 days prior to surgery

## 2025-06-17 ENCOUNTER — PRE-ADMISSION TESTING (OUTPATIENT)
Dept: ADMISSIONS | Facility: MEDICAL CENTER | Age: 80
End: 2025-06-17
Attending: SURGERY
Payer: MEDICARE

## 2025-06-17 DIAGNOSIS — Z01.810 PRE-OPERATIVE CARDIOVASCULAR EXAMINATION: ICD-10-CM

## 2025-06-17 DIAGNOSIS — Z01.812 PRE-OPERATIVE LABORATORY EXAMINATION: Primary | ICD-10-CM

## 2025-06-17 LAB
ANION GAP SERPL CALC-SCNC: 9 MMOL/L (ref 7–16)
BUN SERPL-MCNC: 20 MG/DL (ref 8–22)
CALCIUM SERPL-MCNC: 9.3 MG/DL (ref 8.5–10.5)
CHLORIDE SERPL-SCNC: 105 MMOL/L (ref 96–112)
CO2 SERPL-SCNC: 27 MMOL/L (ref 20–33)
CREAT SERPL-MCNC: 1.01 MG/DL (ref 0.5–1.4)
EKG IMPRESSION: NORMAL
ERYTHROCYTE [DISTWIDTH] IN BLOOD BY AUTOMATED COUNT: 39.6 FL (ref 35.9–50)
GFR SERPLBLD CREATININE-BSD FMLA CKD-EPI: 76 ML/MIN/1.73 M 2
GLUCOSE SERPL-MCNC: 94 MG/DL (ref 65–99)
HCT VFR BLD AUTO: 43.6 % (ref 42–52)
HGB BLD-MCNC: 15.4 G/DL (ref 14–18)
MCH RBC QN AUTO: 31.5 PG (ref 27–33)
MCHC RBC AUTO-ENTMCNC: 35.3 G/DL (ref 32.3–36.5)
MCV RBC AUTO: 89.2 FL (ref 81.4–97.8)
PLATELET # BLD AUTO: 147 K/UL (ref 164–446)
PMV BLD AUTO: 11.4 FL (ref 9–12.9)
POTASSIUM SERPL-SCNC: 4.5 MMOL/L (ref 3.6–5.5)
RBC # BLD AUTO: 4.89 M/UL (ref 4.7–6.1)
SODIUM SERPL-SCNC: 141 MMOL/L (ref 135–145)
WBC # BLD AUTO: 7.7 K/UL (ref 4.8–10.8)

## 2025-06-17 PROCEDURE — 36415 COLL VENOUS BLD VENIPUNCTURE: CPT

## 2025-06-17 PROCEDURE — 85027 COMPLETE CBC AUTOMATED: CPT

## 2025-06-17 PROCEDURE — 80048 BASIC METABOLIC PNL TOTAL CA: CPT

## 2025-06-17 PROCEDURE — 93005 ELECTROCARDIOGRAM TRACING: CPT | Mod: TC

## 2025-06-17 PROCEDURE — 93010 ELECTROCARDIOGRAM REPORT: CPT | Performed by: INTERNAL MEDICINE

## 2025-06-26 ENCOUNTER — ANESTHESIA EVENT (OUTPATIENT)
Dept: SURGERY | Facility: MEDICAL CENTER | Age: 80
End: 2025-06-26
Payer: MEDICARE

## 2025-06-26 ENCOUNTER — HOSPITAL ENCOUNTER (OUTPATIENT)
Facility: MEDICAL CENTER | Age: 80
End: 2025-06-26
Attending: SURGERY | Admitting: SURGERY
Payer: MEDICARE

## 2025-06-26 ENCOUNTER — ANESTHESIA (OUTPATIENT)
Dept: SURGERY | Facility: MEDICAL CENTER | Age: 80
End: 2025-06-26
Payer: MEDICARE

## 2025-06-26 VITALS
HEART RATE: 67 BPM | WEIGHT: 159.17 LBS | BODY MASS INDEX: 24.12 KG/M2 | TEMPERATURE: 97 F | OXYGEN SATURATION: 92 % | SYSTOLIC BLOOD PRESSURE: 160 MMHG | HEIGHT: 68 IN | DIASTOLIC BLOOD PRESSURE: 77 MMHG | RESPIRATION RATE: 18 BRPM

## 2025-06-26 DIAGNOSIS — G89.18 POST-OP PAIN: Primary | ICD-10-CM

## 2025-06-26 PROCEDURE — A9270 NON-COVERED ITEM OR SERVICE: HCPCS | Performed by: ANESTHESIOLOGY

## 2025-06-26 PROCEDURE — 160047 HCHG PACU  - EA ADDL 30 MINS PHASE II: Performed by: SURGERY

## 2025-06-26 PROCEDURE — 160015 HCHG STAT PREOP MINUTES: Performed by: SURGERY

## 2025-06-26 PROCEDURE — 160191 HCHG ANESTHESIA STANDARD: Performed by: SURGERY

## 2025-06-26 PROCEDURE — 160193 HCHG PACU STANDARD - 1ST 60 MINS: Performed by: SURGERY

## 2025-06-26 PROCEDURE — 700102 HCHG RX REV CODE 250 W/ 637 OVERRIDE(OP): Performed by: ANESTHESIOLOGY

## 2025-06-26 PROCEDURE — 160048 HCHG OR STATISTICAL LEVEL 1-5: Performed by: SURGERY

## 2025-06-26 PROCEDURE — 700111 HCHG RX REV CODE 636 W/ 250 OVERRIDE (IP): Performed by: ANESTHESIOLOGY

## 2025-06-26 PROCEDURE — C1781 MESH (IMPLANTABLE): HCPCS | Performed by: SURGERY

## 2025-06-26 PROCEDURE — 160046 HCHG PACU - 1ST 60 MINS PHASE II: Performed by: SURGERY

## 2025-06-26 PROCEDURE — 502714 HCHG ROBOTIC SURGERY SERVICES: Performed by: SURGERY

## 2025-06-26 PROCEDURE — 160025 RECOVERY II MINUTES (STATS): Performed by: SURGERY

## 2025-06-26 PROCEDURE — 160031 HCHG SURGERY MINUTES - 1ST 30 MINS LEVEL 5: Performed by: SURGERY

## 2025-06-26 PROCEDURE — 700101 HCHG RX REV CODE 250: Performed by: SURGERY

## 2025-06-26 PROCEDURE — 160042 HCHG SURGERY MINUTES - EA ADDL 1 MIN LEVEL 5: Performed by: SURGERY

## 2025-06-26 PROCEDURE — 49650 LAP ING HERNIA REPAIR INIT: CPT | Mod: 50 | Performed by: SURGERY

## 2025-06-26 PROCEDURE — 700101 HCHG RX REV CODE 250: Performed by: ANESTHESIOLOGY

## 2025-06-26 PROCEDURE — 700111 HCHG RX REV CODE 636 W/ 250 OVERRIDE (IP): Mod: JZ | Performed by: ANESTHESIOLOGY

## 2025-06-26 PROCEDURE — 700105 HCHG RX REV CODE 258: Performed by: SURGERY

## 2025-06-26 PROCEDURE — 160002 HCHG RECOVERY MINUTES (STAT): Performed by: SURGERY

## 2025-06-26 DEVICE — MESH PROGRIP LAPROSCOPIC SELF FIXATING (1/CA): Type: IMPLANTABLE DEVICE | Site: ABDOMEN | Status: FUNCTIONAL

## 2025-06-26 RX ORDER — DEXAMETHASONE SODIUM PHOSPHATE 4 MG/ML
INJECTION, SOLUTION INTRA-ARTICULAR; INTRALESIONAL; INTRAMUSCULAR; INTRAVENOUS; SOFT TISSUE PRN
Status: DISCONTINUED | OUTPATIENT
Start: 2025-06-26 | End: 2025-06-26 | Stop reason: SURG

## 2025-06-26 RX ORDER — LIDOCAINE 4 G/G
1 PATCH TOPICAL EVERY 24 HOURS
COMMUNITY

## 2025-06-26 RX ORDER — HYDRALAZINE HYDROCHLORIDE 20 MG/ML
5 INJECTION INTRAMUSCULAR; INTRAVENOUS
Status: DISCONTINUED | OUTPATIENT
Start: 2025-06-26 | End: 2025-06-26 | Stop reason: HOSPADM

## 2025-06-26 RX ORDER — LIDOCAINE HYDROCHLORIDE 20 MG/ML
INJECTION, SOLUTION EPIDURAL; INFILTRATION; INTRACAUDAL; PERINEURAL PRN
Status: DISCONTINUED | OUTPATIENT
Start: 2025-06-26 | End: 2025-06-26 | Stop reason: SURG

## 2025-06-26 RX ORDER — LABETALOL HYDROCHLORIDE 5 MG/ML
5 INJECTION, SOLUTION INTRAVENOUS
Status: DISCONTINUED | OUTPATIENT
Start: 2025-06-26 | End: 2025-06-26 | Stop reason: HOSPADM

## 2025-06-26 RX ORDER — CETIRIZINE HYDROCHLORIDE 10 MG/1
10 TABLET ORAL DAILY
COMMUNITY

## 2025-06-26 RX ORDER — KETOCONAZOLE 20 MG/ML
10 SHAMPOO, SUSPENSION TOPICAL
COMMUNITY
Start: 2025-05-21

## 2025-06-26 RX ORDER — METOPROLOL TARTRATE 1 MG/ML
1 INJECTION, SOLUTION INTRAVENOUS
Status: DISCONTINUED | OUTPATIENT
Start: 2025-06-26 | End: 2025-06-26 | Stop reason: HOSPADM

## 2025-06-26 RX ORDER — POLYETHYLENE GLYCOL 3350 17 G/17G
17 POWDER, FOR SOLUTION ORAL PRN
Qty: 510 G | Status: SHIPPED | OUTPATIENT
Start: 2025-06-26

## 2025-06-26 RX ORDER — ALBUTEROL SULFATE 5 MG/ML
2.5 SOLUTION RESPIRATORY (INHALATION)
Status: DISCONTINUED | OUTPATIENT
Start: 2025-06-26 | End: 2025-06-26 | Stop reason: HOSPADM

## 2025-06-26 RX ORDER — POLYETHYLENE GLYCOL 3350 17 G/17G
17 POWDER, FOR SOLUTION ORAL PRN
Qty: 510 G | Status: SHIPPED | OUTPATIENT
Start: 2025-06-26 | End: 2025-06-26

## 2025-06-26 RX ORDER — FLUTICASONE PROPIONATE 50 MCG
1 SPRAY, SUSPENSION (ML) NASAL DAILY
COMMUNITY

## 2025-06-26 RX ORDER — SODIUM CHLORIDE, SODIUM LACTATE, POTASSIUM CHLORIDE, CALCIUM CHLORIDE 600; 310; 30; 20 MG/100ML; MG/100ML; MG/100ML; MG/100ML
INJECTION, SOLUTION INTRAVENOUS CONTINUOUS
Status: ACTIVE | OUTPATIENT
Start: 2025-06-26 | End: 2025-06-26

## 2025-06-26 RX ORDER — HYDROMORPHONE HYDROCHLORIDE 1 MG/ML
0.4 INJECTION, SOLUTION INTRAMUSCULAR; INTRAVENOUS; SUBCUTANEOUS
Status: DISCONTINUED | OUTPATIENT
Start: 2025-06-26 | End: 2025-06-26 | Stop reason: HOSPADM

## 2025-06-26 RX ORDER — OXYCODONE HCL 5 MG/5 ML
5 SOLUTION, ORAL ORAL
Status: COMPLETED | OUTPATIENT
Start: 2025-06-26 | End: 2025-06-26

## 2025-06-26 RX ORDER — DIPHENHYDRAMINE HYDROCHLORIDE 50 MG/ML
12.5 INJECTION, SOLUTION INTRAMUSCULAR; INTRAVENOUS
Status: DISCONTINUED | OUTPATIENT
Start: 2025-06-26 | End: 2025-06-26 | Stop reason: HOSPADM

## 2025-06-26 RX ORDER — SUCCINYLCHOLINE CHLORIDE 20 MG/ML
INJECTION INTRAMUSCULAR; INTRAVENOUS PRN
Status: DISCONTINUED | OUTPATIENT
Start: 2025-06-26 | End: 2025-06-26 | Stop reason: SURG

## 2025-06-26 RX ORDER — SODIUM CHLORIDE, SODIUM LACTATE, POTASSIUM CHLORIDE, CALCIUM CHLORIDE 600; 310; 30; 20 MG/100ML; MG/100ML; MG/100ML; MG/100ML
INJECTION, SOLUTION INTRAVENOUS CONTINUOUS
Status: DISCONTINUED | OUTPATIENT
Start: 2025-06-26 | End: 2025-06-26 | Stop reason: HOSPADM

## 2025-06-26 RX ORDER — HALOPERIDOL 5 MG/ML
1 INJECTION INTRAMUSCULAR
Status: DISCONTINUED | OUTPATIENT
Start: 2025-06-26 | End: 2025-06-26 | Stop reason: HOSPADM

## 2025-06-26 RX ORDER — METOCLOPRAMIDE HYDROCHLORIDE 5 MG/ML
INJECTION INTRAMUSCULAR; INTRAVENOUS PRN
Status: DISCONTINUED | OUTPATIENT
Start: 2025-06-26 | End: 2025-06-26 | Stop reason: SURG

## 2025-06-26 RX ORDER — ONDANSETRON 2 MG/ML
4 INJECTION INTRAMUSCULAR; INTRAVENOUS
Status: DISCONTINUED | OUTPATIENT
Start: 2025-06-26 | End: 2025-06-26 | Stop reason: HOSPADM

## 2025-06-26 RX ORDER — OXYCODONE AND ACETAMINOPHEN 5; 325 MG/1; MG/1
1 TABLET ORAL EVERY 6 HOURS PRN
Qty: 20 TABLET | Refills: 0 | Status: SHIPPED | OUTPATIENT
Start: 2025-06-26 | End: 2025-06-26

## 2025-06-26 RX ORDER — HYDROMORPHONE HYDROCHLORIDE 1 MG/ML
0.2 INJECTION, SOLUTION INTRAMUSCULAR; INTRAVENOUS; SUBCUTANEOUS
Status: DISCONTINUED | OUTPATIENT
Start: 2025-06-26 | End: 2025-06-26 | Stop reason: HOSPADM

## 2025-06-26 RX ORDER — BUPIVACAINE HYDROCHLORIDE AND EPINEPHRINE 5; 5 MG/ML; UG/ML
INJECTION, SOLUTION EPIDURAL; INTRACAUDAL; PERINEURAL
Status: DISCONTINUED | OUTPATIENT
Start: 2025-06-26 | End: 2025-06-26 | Stop reason: HOSPADM

## 2025-06-26 RX ORDER — MAGNESIUM SULFATE HEPTAHYDRATE 40 MG/ML
INJECTION, SOLUTION INTRAVENOUS PRN
Status: DISCONTINUED | OUTPATIENT
Start: 2025-06-26 | End: 2025-06-26 | Stop reason: SURG

## 2025-06-26 RX ORDER — OXYCODONE HCL 5 MG/5 ML
10 SOLUTION, ORAL ORAL
Status: COMPLETED | OUTPATIENT
Start: 2025-06-26 | End: 2025-06-26

## 2025-06-26 RX ORDER — OXYCODONE AND ACETAMINOPHEN 5; 325 MG/1; MG/1
1 TABLET ORAL EVERY 6 HOURS PRN
Qty: 20 TABLET | Refills: 0 | Status: SHIPPED | OUTPATIENT
Start: 2025-06-26 | End: 2025-07-03

## 2025-06-26 RX ORDER — CEFAZOLIN SODIUM 1 G/3ML
INJECTION, POWDER, FOR SOLUTION INTRAMUSCULAR; INTRAVENOUS PRN
Status: DISCONTINUED | OUTPATIENT
Start: 2025-06-26 | End: 2025-06-26 | Stop reason: SURG

## 2025-06-26 RX ORDER — EPHEDRINE SULFATE 50 MG/ML
INJECTION, SOLUTION INTRAVENOUS PRN
Status: DISCONTINUED | OUTPATIENT
Start: 2025-06-26 | End: 2025-06-26 | Stop reason: SURG

## 2025-06-26 RX ORDER — EPHEDRINE SULFATE 50 MG/ML
5 INJECTION, SOLUTION INTRAVENOUS
Status: DISCONTINUED | OUTPATIENT
Start: 2025-06-26 | End: 2025-06-26 | Stop reason: HOSPADM

## 2025-06-26 RX ORDER — ROCURONIUM BROMIDE 10 MG/ML
INJECTION, SOLUTION INTRAVENOUS PRN
Status: DISCONTINUED | OUTPATIENT
Start: 2025-06-26 | End: 2025-06-26 | Stop reason: SURG

## 2025-06-26 RX ORDER — LABETALOL HYDROCHLORIDE 5 MG/ML
INJECTION, SOLUTION INTRAVENOUS PRN
Status: DISCONTINUED | OUTPATIENT
Start: 2025-06-26 | End: 2025-06-26 | Stop reason: SURG

## 2025-06-26 RX ORDER — ONDANSETRON 2 MG/ML
INJECTION INTRAMUSCULAR; INTRAVENOUS PRN
Status: DISCONTINUED | OUTPATIENT
Start: 2025-06-26 | End: 2025-06-26 | Stop reason: SURG

## 2025-06-26 RX ORDER — HYDROMORPHONE HYDROCHLORIDE 1 MG/ML
0.1 INJECTION, SOLUTION INTRAMUSCULAR; INTRAVENOUS; SUBCUTANEOUS
Status: DISCONTINUED | OUTPATIENT
Start: 2025-06-26 | End: 2025-06-26 | Stop reason: HOSPADM

## 2025-06-26 RX ORDER — SCOPOLAMINE 1 MG/3D
1 PATCH, EXTENDED RELEASE TRANSDERMAL
Status: DISCONTINUED | OUTPATIENT
Start: 2025-06-26 | End: 2025-06-26 | Stop reason: HOSPADM

## 2025-06-26 RX ADMIN — OXYCODONE HYDROCHLORIDE 10 MG: 5 SOLUTION ORAL at 11:24

## 2025-06-26 RX ADMIN — FENTANYL CITRATE 100 MCG: 50 INJECTION, SOLUTION INTRAMUSCULAR; INTRAVENOUS at 10:07

## 2025-06-26 RX ADMIN — DEXAMETHASONE SODIUM PHOSPHATE 8 MG: 4 INJECTION INTRA-ARTICULAR; INTRALESIONAL; INTRAMUSCULAR; INTRAVENOUS; SOFT TISSUE at 10:07

## 2025-06-26 RX ADMIN — LIDOCAINE HYDROCHLORIDE 40 MG: 20 INJECTION, SOLUTION EPIDURAL; INFILTRATION; INTRACAUDAL; PERINEURAL at 10:07

## 2025-06-26 RX ADMIN — ROCURONIUM BROMIDE 30 MG: 10 INJECTION INTRAVENOUS at 10:14

## 2025-06-26 RX ADMIN — HALOPERIDOL LACTATE 1 MG: 5 INJECTION, SOLUTION INTRAMUSCULAR at 11:23

## 2025-06-26 RX ADMIN — SCOPOLAMINE 1 PATCH: 1.5 PATCH, EXTENDED RELEASE TRANSDERMAL at 09:53

## 2025-06-26 RX ADMIN — FENTANYL CITRATE 25 MCG: 50 INJECTION, SOLUTION INTRAMUSCULAR; INTRAVENOUS at 11:36

## 2025-06-26 RX ADMIN — SUGAMMADEX 200 MG: 100 INJECTION, SOLUTION INTRAVENOUS at 10:49

## 2025-06-26 RX ADMIN — LABETALOL HYDROCHLORIDE 15 MG: 5 INJECTION, SOLUTION INTRAVENOUS at 10:37

## 2025-06-26 RX ADMIN — FENTANYL CITRATE 25 MCG: 50 INJECTION, SOLUTION INTRAMUSCULAR; INTRAVENOUS at 11:24

## 2025-06-26 RX ADMIN — FENTANYL CITRATE 25 MCG: 50 INJECTION, SOLUTION INTRAMUSCULAR; INTRAVENOUS at 11:47

## 2025-06-26 RX ADMIN — SUCCINYLCHOLINE CHLORIDE 140 MG: 20 INJECTION, SOLUTION INTRAMUSCULAR; INTRAVENOUS at 10:07

## 2025-06-26 RX ADMIN — METOCLOPRAMIDE HYDROCHLORIDE 10 MG: 5 INJECTION INTRAMUSCULAR; INTRAVENOUS at 10:07

## 2025-06-26 RX ADMIN — FENTANYL CITRATE 25 MCG: 50 INJECTION, SOLUTION INTRAMUSCULAR; INTRAVENOUS at 12:09

## 2025-06-26 RX ADMIN — ONDANSETRON 4 MG: 2 INJECTION INTRAMUSCULAR; INTRAVENOUS at 10:07

## 2025-06-26 RX ADMIN — EPHEDRINE SULFATE 25 MG: 50 INJECTION, SOLUTION INTRAVENOUS at 10:20

## 2025-06-26 RX ADMIN — PROPOFOL 140 MG: 10 INJECTION, EMULSION INTRAVENOUS at 10:07

## 2025-06-26 RX ADMIN — SODIUM CHLORIDE, POTASSIUM CHLORIDE, SODIUM LACTATE AND CALCIUM CHLORIDE: 600; 310; 30; 20 INJECTION, SOLUTION INTRAVENOUS at 09:55

## 2025-06-26 RX ADMIN — CEFAZOLIN 2 G: 1 INJECTION, POWDER, FOR SOLUTION INTRAMUSCULAR; INTRAVENOUS at 10:07

## 2025-06-26 RX ADMIN — MAGNESIUM SULFATE HEPTAHYDRATE 2 G: 2 INJECTION, SOLUTION INTRAVENOUS at 10:11

## 2025-06-26 ASSESSMENT — PAIN DESCRIPTION - PAIN TYPE
TYPE: SURGICAL PAIN

## 2025-06-26 ASSESSMENT — FIBROSIS 4 INDEX: FIB4 SCORE: 10.37

## 2025-06-26 NOTE — ANESTHESIA PROCEDURE NOTES
Airway    Date/Time: 6/26/2025 10:08 AM    Performed by: David Acuña M.D.  Authorized by: David Acuña M.D.    Location:  OR  Urgency:  Elective  Indications for Airway Management:  Anesthesia      Spontaneous Ventilation: absent    Sedation Level:  Deep  Preoxygenated: Yes    Patient Position:  Sniffing  Mask Difficulty Assessment:  0 - not attempted  Final Airway Type:  Endotracheal airway  Final Endotracheal Airway:  ETT  Cuffed: Yes    Technique Used for Successful ETT Placement:  Direct laryngoscopy    Insertion Site:  Oral  Blade Type:  Dayne  Laryngoscope Blade/Videolaryngoscope Blade Size:  3  ETT Size (mm):  6.5  Measured from:  Teeth  ETT to Teeth (cm):  22  Placement Verified by: auscultation and capnometry    Cormack-Lehane Classification:  Grade IIa - partial view of glottis  Number of Attempts at Approach:  1

## 2025-06-26 NOTE — ANESTHESIA POSTPROCEDURE EVALUATION
Patient: Ton Christine    Procedure Summary       Date: 06/26/25 Room / Location: [place] [address] / SURGERY [facility]    Anesthesia Start: 1002 Anesthesia Stop: 1106    Procedure: ROBOTIC BILATERAL INGUINAL HERNIA REPAIR WITH MESH (Bilateral: Abdomen) Diagnosis: (BILATERAL INGUINAL HERNIA)    Surgeons: Wesley Hoffman M.D. Responsible Provider: David Acuña M.D.    Anesthesia Type: general ASA Status: 2            Final Anesthesia Type: general  Last vitals  BP   Blood Pressure : (!) 184/89    Temp   36.4 °C (97.5 °F)    Pulse   69   Resp   16    SpO2   99 %      Anesthesia Post Evaluation    Patient location during evaluation: PACU  Patient participation: complete - patient participated  Level of consciousness: awake and alert    Airway patency: patent  Anesthetic complications: no  Cardiovascular status: hemodynamically stable  Respiratory status: acceptable  Hydration status: euvolemic    PONV: none          There were no known notable events for this encounter.     Nurse Pain Score: 0 (NPRS)

## 2025-06-26 NOTE — PROGRESS NOTES
Pharmacy Medication Reconciliation      ~Medication reconciliation updated and complete per patient   ~Allergies have been verified and updated   ~No oral ABX within the last 30 days  ~Is dispense history available in EPIC: yes  ~Patient home pharmacy :  [facility] [phone]      ~Anticoagulants (rivaroxaban, apixaban, edoxaban, dabigatran, warfarin, enoxaparin) taken in the last 14 days? No

## 2025-06-26 NOTE — ANESTHESIA TIME REPORT
Anesthesia Start and Stop Event Times       Date Time Event    6/26/2025 0950 Ready for Procedure     1002 Anesthesia Start     1106 Anesthesia Stop          Responsible Staff  06/26/25      Name Role Begin End    David Acuña M.D. Anesth 1002 1106          Overtime Reason:  no overtime (within assigned shift)    Comments:

## 2025-06-26 NOTE — OP REPORT
DATE OF SERVICE:  06/26/2025     PREOPERATIVE DIAGNOSIS:  Bilateral inguinal hernias.     POSTOPERATIVE DIAGNOSIS:  Bilateral inguinal hernias.     PROCEDURE:  Robotic bilateral inguinal hernia repairs with mesh.     SURGEON:  Wesley Hoffman MD     ASSISTANT:  JEFF Estrada     ANESTHESIA:  General endotracheal anesthesia.     ESTIMATED BLOOD LOSS:  5 mL.     SPECIMENS:  None.     COMPLICATIONS:  None.     CONDITION:  Stable.     INDICATIONS FOR PROCEDURE:  This is a 80-year-old male who presents with   bilateral reducible inguinal hernias for elective repair.  Risks, benefits,   and alternatives of surgery were explained to him before proceeding.     OPERATIVE FINDINGS:  Bilateral indirect inguinal hernia was reduced and   repaired with preperitoneal meshes in place.     OPERATIVE TECHNIQUE:  After informed consent was obtained, the patient was   taken to the operating room and placed in the supine position.  After adequate   endotracheal anesthesia was achieved, the abdomen was prepped and draped in a   sterile fashion.  Operation was begun by placing an 8 mm periumbilical   incision through which an 8-mm trocar was introduced into the abdomen using   Optiview technique.  After a pneumoperitoneum was achieved, 2 additional 8 mm   trocars were placed into the abdomen.  All trocars were placed with 0.5%   Marcaine with epinephrine for local anesthesia.  The patient was positioned   and [employer] Exchange Xi antibiotic system was docked for a pelvic approach.  We began   by opening the preperitoneal space on the right side and dissecting into this   plane.  We identified the epigastric vessels and Veto's ligament.  We   reduced the indirect hernia sac off the spermatic cord structures and cleared   the lateral space for the mesh.  We then completed our dissection on the left   side in a similar fashion, identifying the same structures and reducing the   same indirect inguinal hernia on this side as well.  We then placed two  10x15   cm ProGrip meshes into the preperitoneal spaces overlapping the pelvic   ligaments and hernia defects.  We then closed the spaces with running 2-0   Stratafix sutures.  Needles were retrieved.  Pneumoperitoneum was reduced and   trocars were removed.  Incisions were closed with 4-0 Monocryl and Dermabond.    The patient was then returned to the PACU in stable condition.  All   instrument counts were correct at the end of the procedure.     Case required and assisted due to the complexity of the surgery. The assistant   helped with trocar placement, camera operation, robotic operation, mesh   placements, wound closures, and operative decision making.        ______________________________  MD FLACO Santos/SHITAL    DD:  06/26/2025 10:58  DT:  06/26/2025 13:29    Job#:  [id number]

## 2025-06-26 NOTE — OR NURSING
Patient arrived in PACU, VSS. Hernia incisions x3 on lower abdomen with dermabond, CDI. Patient alert and oriented x4. Medicated for pain per MAR. Attempted to call Cristin to update, no VM left.   Called report to JULY Elkins. Transported to phase 2 on room air.

## 2025-06-26 NOTE — DISCHARGE INSTRUCTIONS
HOME CARE INSTRUCTIONS    ACTIVITY: Rest and take it easy for the first 24 hours.  A responsible adult is recommended to remain with you during that time.  It is normal to feel sleepy.  We encourage you to not do anything that requires balance, judgment or coordination.    FOR 24 HOURS DO NOT:  Drive, operate machinery or run household appliances.  Drink beer or alcoholic beverages.  Make important decisions or sign legal documents.    SPECIAL INSTRUCTIONS:   Diet as tolerated  May shower daily with wounds uncovered  No heavy lifting or straining for 4 weeks after surgery    DIET: To avoid nausea, slowly advance diet as tolerated, avoiding spicy or greasy foods for the first day.  Add more substantial food to your diet according to your physician's instructions.  Babies can be fed formula or breast milk as soon as they are hungry.  INCREASE FLUIDS AND FIBER TO AVOID CONSTIPATION.    SURGICAL DRESSING/BATHING: Okay to shower starting tomorrow. No submerging into water until cleared by MD    MEDICATIONS: Resume taking daily medication.  Take prescribed pain medication with food.  If no medication is prescribed, you may take non-aspirin pain medication if needed.  PAIN MEDICATION CAN BE VERY CONSTIPATING.  Take a stool softener or laxative such as senokot, pericolace, or milk of magnesia if needed.    Prescription given for Percocet.     Given scopalamine patch.   Last pain medication given Roxicodone at 11:21AM     A follow-up appointment should be arranged with your doctor in 1-2 weeks; call to schedule.    You should CALL YOUR PHYSICIAN if you develop:  Fever greater than 101 degrees F.  Pain not relieved by medication, or persistent nausea or vomiting.  Excessive bleeding (blood soaking through dressing) or unexpected drainage from the wound.  Extreme redness or swelling around the incision site, drainage of pus or foul smelling drainage.  Inability to urinate or empty your bladder within 8 hours.  Problems with  breathing or chest pain.    You should call 911 if you develop problems with breathing or chest pain.  If you are unable to contact your doctor or surgical center, you should go to the nearest emergency room or urgent care center.  Physician's telephone #: [phone]     MILD FLU-LIKE SYMPTOMS ARE NORMAL.  YOU MAY EXPERIENCE GENERALIZED MUSCLE ACHES, THROAT IRRITATION, HEADACHE AND/OR SOME NAUSEA.    If any questions arise, call your doctor.  If your doctor is not available, please feel free to call the Surgical Center at [phone].  The Center is open Monday through Friday from 7AM to 7PM.      A registered nurse may call you a few days after your surgery to see how you are doing after your procedure.    You may also receive a survey in the mail within the next two weeks and we ask that you take a few moments to complete the survey and return it to us.  Our goal is to provide you with very good care and we value your comments.     Depression / Suicide Risk    As you are discharged from this [facility] facility, it is important to learn how to keep safe from harming yourself.    Recognize the warning signs:  Abrupt changes in personality, positive or negative- including increase in energy   Giving away possessions  Change in eating patterns- significant weight changes-  positive or negative  Change in sleeping patterns- unable to sleep or sleeping all the time   Unwillingness or inability to communicate  Depression  Unusual sadness, discouragement and loneliness  Talk of wanting to die  Neglect of personal appearance   Rebelliousness- reckless behavior  Withdrawal from people/activities they love  Confusion- inability to concentrate     If you or a loved one observes any of these behaviors or has concerns about self-harm, here's what you can do:  Talk about it- your feelings and reasons for harming yourself  Remove any means that you might use to hurt yourself (examples: pills, rope, extension cords,  firearm)  Get professional help from the community (Mental Health, Substance Abuse, psychological counseling)  Do not be alone:Call your Safe Contact- someone whom you trust who will be there for you.  Call your local CRISIS HOTLINE 530-9735 or 467-939-4216  Call your local Children's Mobile Crisis Response Team [place] (222) 714-3104 or www.[employer]  Call the toll free National Suicide Prevention Hotlines   National Suicide Prevention Lifeline 173-563-MVHM (3691)  [facility] Line Network 800-SUICIDE (616-1834)    I acknowledge receipt and understanding of these Home Care instructions.

## 2025-06-26 NOTE — ANESTHESIA PREPROCEDURE EVALUATION
Case: [id number] Date/Time: 06/26/25 1035    Procedure: ROBOTIC BILATERAL INGUINAL HERNIA REPAIR WITH MESH    Pre-op diagnosis: BILATERAL INGUINAL HERNIA    Location: [place] OR 11 / SURGERY [facility]    Surgeons: Wesley Hoffman M.D.            Relevant Problems   CARDIAC   (positive) Hypertension       Physical Exam    Airway   Mallampati: II  TM distance: >3 FB  Neck ROM: full       Cardiovascular - normal exam  Rhythm: regular  Rate: normal    (-) murmur     Dental - normal exam           Pulmonary - normal examBreath sounds clear to auscultation     Abdominal    Neurological - normal exam                   Anesthesia Plan    ASA 2       Plan - general       Airway plan will be ETT          Induction: intravenous    Postoperative Plan: Postoperative administration of opioids is intended.    Pertinent diagnostic labs and testing reviewed    Informed Consent:    Anesthetic plan and risks discussed with patient.    Use of blood products discussed with: patient whom consented to blood products.

## 2025-07-16 ENCOUNTER — OFFICE VISIT (OUTPATIENT)
Facility: MEDICAL CENTER | Age: 80
End: 2025-07-16
Payer: MEDICARE

## 2025-07-16 VITALS
HEIGHT: 68 IN | OXYGEN SATURATION: 97 % | TEMPERATURE: 98.1 F | WEIGHT: 157.08 LBS | SYSTOLIC BLOOD PRESSURE: 112 MMHG | BODY MASS INDEX: 23.81 KG/M2 | HEART RATE: 57 BPM | DIASTOLIC BLOOD PRESSURE: 64 MMHG

## 2025-07-16 DIAGNOSIS — Z98.890 S/P INGUINAL HERNIA REPAIR: Primary | ICD-10-CM

## 2025-07-16 DIAGNOSIS — Z87.19 S/P INGUINAL HERNIA REPAIR: Primary | ICD-10-CM

## 2025-07-16 PROCEDURE — 3074F SYST BP LT 130 MM HG: CPT | Performed by: NURSE PRACTITIONER

## 2025-07-16 PROCEDURE — 3078F DIAST BP <80 MM HG: CPT | Performed by: NURSE PRACTITIONER

## 2025-07-16 PROCEDURE — 99024 POSTOP FOLLOW-UP VISIT: CPT | Performed by: NURSE PRACTITIONER

## 2025-07-16 ASSESSMENT — ENCOUNTER SYMPTOMS
ABDOMINAL PAIN: 0
DIZZINESS: 0
DIAPHORESIS: 0
DIARRHEA: 0
CONSTIPATION: 0
ABDOMINAL DISTENTION: 0
VOMITING: 0
CHILLS: 0
FEVER: 0
SHORTNESS OF BREATH: 0
LIGHT-HEADEDNESS: 0

## 2025-07-16 ASSESSMENT — FIBROSIS 4 INDEX: FIB4 SCORE: 10.37

## 2025-07-16 NOTE — PROGRESS NOTES
Subjective:   7/16/2025  9:24 AM  Primary care physician:Pcp Pt States None      Chief Complaint:   Chief Complaint   Patient presents with    Post-op     post bilateral inguinal hernia repair by Yasmin 6/26     Diagnosis: No diagnosis found.    History of presenting illness:  Ton Christine  is a pleasant 80 y.o. year old male s/p robotic bilateral inguinal hernia repairs with mesh  with Dr Hoffman on 6/26    Ton reports that he is recovering well post-operatively with decreasing pain over this past week. He has been using acetaminophen sparingly and with adequate relief. Notes some mild ongoing groin soreness and scrotal swelling, but notes that these are both improving. He as been walking and reports mild discomfort, but otherwise returning to normal activities and is adhering to lifting restrictions.       Past Medical History[1]  Past Surgical History[2]  Allergies[3]  Encounter Medications[4]  Social History     Socioeconomic History    Marital status:      Spouse name: Not on file    Number of children: Not on file    Years of education: Not on file    Highest education level: Not on file   Occupational History    Not on file   Tobacco Use    Smoking status: Former    Smokeless tobacco: Never    Tobacco comments:     Quit 1970   Vaping Use    Vaping status: Never Used   Substance and Sexual Activity    Alcohol use: Not Currently    Drug use: Never    Sexual activity: Not on file   Other Topics Concern    Not on file   Social History Narrative    Not on file     Social Drivers of Health     Financial Resource Strain: Not on file   Food Insecurity: Not on file   Transportation Needs: Not on file   Physical Activity: Not on file   Stress: Not on file   Social Connections: Not on file   Intimate Partner Violence: Not on file   Housing Stability: Not on file      Tobacco Use History[5]  Social History     Substance and Sexual Activity   Alcohol Use Not Currently     Social History     Substance and  "Sexual Activity   Drug Use Never        Family History   Problem Relation Age of Onset    Hypertension Mother                 Review of Systems   Constitutional:  Negative for chills, diaphoresis and fever.   Respiratory:  Negative for shortness of breath.    Cardiovascular:  Negative for chest pain.   Gastrointestinal:  Negative for abdominal distention, abdominal pain, constipation, diarrhea and vomiting.   Genitourinary:  Negative for difficulty urinating and dysuria.    Neurological:  Negative for dizziness and light-headedness.         Objective:   /64 (BP Location: Right arm, Patient Position: Sitting, BP Cuff Size: Adult)   Pulse (!) 57   Temp 36.7 °C (98.1 °F) (Temporal)   Ht 1.727 m (5' 8\")   Wt 71.2 kg (157 lb 1.2 oz)   SpO2 97%   BMI 23.88 kg/m²     Physical Exam  Vitals and nursing note reviewed.   Constitutional:       Appearance: Normal appearance. He is normal weight.   HENT:      Head: Normocephalic.      Nose: Nose normal.      Mouth/Throat:      Mouth: Mucous membranes are moist.   Eyes:      Extraocular Movements: Extraocular movements intact.   Cardiovascular:      Rate and Rhythm: Normal rate and regular rhythm.      Pulses: Normal pulses.   Pulmonary:      Effort: Pulmonary effort is normal.   Abdominal:      General: Abdomen is flat.      Palpations: Abdomen is soft.      Comments: Laparoscopic incisions well-approximated without dehiscence, erythema, drainage. Minimal ecchymosis surrounding incision sites.      Musculoskeletal:         General: Normal range of motion.      Cervical back: Normal range of motion.   Skin:     General: Skin is warm and dry.   Neurological:      Mental Status: He is alert and oriented to person, place, and time.   Psychiatric:         Mood and Affect: Mood normal.         Behavior: Behavior normal.         Thought Content: Thought content normal.         Judgment: Judgment normal.         Labs:  Lab Results   Component Value Date/Time    " SODIUM 141 06/17/2025 09:52 AM    POTASSIUM 4.5 06/17/2025 09:52 AM    CHLORIDE 105 06/17/2025 09:52 AM    CO2 27 06/17/2025 09:52 AM    GLUCOSE 94 06/17/2025 09:52 AM    BUN 20 06/17/2025 09:52 AM    CREATININE 1.01 06/17/2025 09:52 AM        Lab Results   Component Value Date/Time    PROTHROMBTM 14.0 03/04/2022 03:59 AM    INR 1.11 03/04/2022 03:59 AM        Lab Results   Component Value Date/Time    WBC 7.7 06/17/2025 09:52 AM    RBC 4.89 06/17/2025 09:52 AM    HEMOGLOBIN 15.4 06/17/2025 09:52 AM    HEMATOCRIT 43.6 06/17/2025 09:52 AM    MCV 89.2 06/17/2025 09:52 AM    MCH 31.5 06/17/2025 09:52 AM    MCHC 35.3 06/17/2025 09:52 AM    MPV 11.4 06/17/2025 09:52 AM    NEUTSPOLYS 80.00 (H) 09/27/2024 04:54 PM    LYMPHOCYTES 13.60 (L) 09/27/2024 04:54 PM    MONOCYTES 4.70 09/27/2024 04:54 PM    EOSINOPHILS 0.90 09/27/2024 04:54 PM    BASOPHILS 0.30 09/27/2024 04:54 PM          Imaging:  US-INGUINAL HERNIA  Narrative: 4/8/2025 7:02 PM    HISTORY/REASON FOR EXAM:  Pain    TECHNIQUE/EXAM DESCRIPTION AND NUMBER OF VIEWS:  Bilateral inguinal ultrasound with and without Valsalva maneuver.    COMPARISON: None    FINDINGS:    Inguinal hernias are seen bilaterally.    On the right, the hernia measures 1.3 x 1.7 cm.    On the left the hernia measures 1.8 x 1.8 cm.    Both hernias are at least partially reducible.    No bowel loops in the right inguinal hernia. Suspect of bowel loops bulging into the left inguinal hernia.  Impression: 1.  Small fat-containing, reducible right inguinal hernia.  2.  Small reducible left inguinal hernia, with bowel loop bulging into the hernia sac.      Pathology:  Pathology Results (Last result in 90 days)    None              Diagnosis:     No diagnosis found.        Medical Decision Making:  Today's Assessment / Status / Plan:     Patient is doing well after hernia repair.  We discussed returning to daily activities.  Encouraged him to avoid lifting greater than 10 pounds for a total of 4 weeks  after surgery. OK to continue icing and OTC NSAIDS PRN. We discussed potential postoperative complications and he will return to my office as needed.                 [1]   Past Medical History:  Diagnosis Date    BPH (benign prostatic hyperplasia)     Cancer (HCC)     MELANOMA LEFT ARM    Cataract     IOL    High cholesterol     Hypertension     Hypertensive urgency 03/03/2022    Inguinal hernia     PONV (postoperative nausea and vomiting)    [2]   Past Surgical History:  Procedure Laterality Date    REPAIR HERNIA INGUINAL ROBOT ASSISTED, USING DV5 Bilateral 6/26/2025    Procedure: ROBOTIC BILATERAL INGUINAL HERNIA REPAIR WITH MESH;  Surgeon: Wesley Hoffman M.D.;  Location: SURGERY [facility];  Service: Gen Robotic    KO BY LAPAROSCOPY N/A 03/04/2022    Procedure: CHOLECYSTECTOMY, LAPAROSCOPIC;  Surgeon: Iam Mcghee D.O.;  Location: SURGERY [facility];  Service: General    CATARACT EXTRACTION WITH IOL Bilateral 2019   [3]   Allergies  Allergen Reactions    Ampicillin Vomiting and Swelling     Childhood allergy/ unkn reaction    Morphine Vomiting and Nausea     vomiting   [4]   Outpatient Encounter Medications as of 7/16/2025   Medication Sig Dispense Refill    ketoconazole (NIZORAL) 2 % shampoo Apply 10 mL topically 1 time a day as needed for Itching.      cetirizine (ZYRTEC) 10 MG Tab Take 10 mg by mouth every day.      fluticasone (FLONASE) 50 MCG/ACT nasal spray Administer 1 Spray into affected nostril(S) every day.      lidocaine (SALONPAS PAIN RELIEVING) 4 % Patch Place 1 Patch on the skin every 24 hours.      polyethylene glycol 3350 (MIRALAX) 17 GM/SCOOP Powder Take 17 g by mouth as needed (constipation). 510 g PRN    lisinopril (PRINIVIL) 10 MG Tab Take 10 mg by mouth every evening.      NON SPECIFIED Take  by mouth. OTC supplement to assist with BPH- drops      Coenzyme Q10 100 MG Cap Take 1 Capsule by mouth every day.      Omega-3 1000 MG Cap Take 1 Capsule by mouth every day.      acetaminophen  (TYLENOL) 325 MG Tab Take 2 Tablets by mouth every four hours as needed. 30 Tablet 0    aspirin 81 MG EC tablet Take 81 mg by mouth every day.      simvastatin (ZOCOR) 40 MG Tab Take 40 mg by mouth every evening.      therapeutic multivitamin-minerals (THERAGRAN-M) Tab Take 1 Tablet by mouth every day. Centrum Mens       No facility-administered encounter medications on file as of 7/16/2025.   [5]   Social History  Tobacco Use   Smoking Status Former   Smokeless Tobacco Never   Tobacco Comments    Quit 1970

## (undated) DEVICE — SUCTION INSTRUMENT YANKAUER BULBOUS TIP W/O VENT (50EA/CA)

## (undated) DEVICE — SET SUCTION/IRRIGATION WITH DISPOSABLE TIP (6/CA )PART #0250-070-520 IS A SUB

## (undated) DEVICE — CLIP MED LG INTNL HRZN TI ESCP - (20/BX)

## (undated) DEVICE — TUBE CONNECT SUCTION CLEAR 120 X 1/4" (50EA/CA)"

## (undated) DEVICE — CHLORAPREP 26 ML APPLICATOR - ORANGE TINT(25/CA)

## (undated) DEVICE — CANISTER SUCTION 3000ML MECHANICAL FILTER AUTO SHUTOFF MEDI-VAC NONSTERILE LF DISP (40EA/CA)

## (undated) DEVICE — HEAD HOLDER JUNIOR/ADULT

## (undated) DEVICE — ELECTRODE 850 FOAM ADHESIVE - HYDROGEL RADIOTRNSPRNT (50/PK)

## (undated) DEVICE — SENSOR SPO2 NEO LNCS ADHESIVE (20/BX) SEE USER NOTES

## (undated) DEVICE — DRAPE COLUMN BOX OF 20

## (undated) DEVICE — SUTURE 2-0 20CM STRATAFIX SPIRAL SH NEEDLE (12/BX)

## (undated) DEVICE — SHEARS MONOPOLAR CURVED DA VINCI 10X'S REUSABLE

## (undated) DEVICE — GLOVE SZ 6 BIOGEL PI MICRO - PF LF (50PR/BX 4BX/CA)

## (undated) DEVICE — GLOVE BIOGEL PI INDICATOR SZ 7.5 SURGICAL PF LF -(50/BX 4BX/CA)

## (undated) DEVICE — GLOVE BIOGEL INDICATOR SZ 7SURGICAL PF LTX - (50/BX 4BX/CA)

## (undated) DEVICE — GLOVE BIOGEL PI ORTHO SZ 8 PF LF (40PR/BX)

## (undated) DEVICE — SEAL UNIVERSAL 5MM-12MM (10EA/BX)

## (undated) DEVICE — SET LEADWIRE 5 LEAD BEDSIDE DISPOSABLE ECG (1SET OF 5/EA)

## (undated) DEVICE — CANISTER SUCTION 3000ML MECHANICAL FILTER AUTO SHUTOFF MEDI-VAC NONSTERILE LF DISP  (40EA/CA)

## (undated) DEVICE — GOWN WARMING STANDARD FLEX - (30/CA)

## (undated) DEVICE — PACK DAVINCI GENERAL (3EA/CA)

## (undated) DEVICE — TOWEL STOP TIMEOUT SAFETY FLAG (40EA/CA)

## (undated) DEVICE — SLEEVE VASO DVT COMPRESSION CALF MED - (10PR/CA)

## (undated) DEVICE — SLEEVE VASO CALF MED - (10PR/CA)

## (undated) DEVICE — STERI STRIP COMPOUND BENZOIN - TINCTURE 0.6ML WITH APPLICATOR (40EA/BX)

## (undated) DEVICE — WATER IRRIGATION STERILE 1000ML (12EA/CA)

## (undated) DEVICE — TROCAR LAPSCP 100MM 12MM NTHRD - (6/BX)

## (undated) DEVICE — DERMABOND ADVANCED - (12EA/BX)

## (undated) DEVICE — GLOVE SZ 6.5 BIOGEL PI MICRO - PF LF (50PR/BX)

## (undated) DEVICE — CLOSURE SKIN STRIP 1/2 X 4 IN - (STERI STRIP) (50/BX 4BX/CA)

## (undated) DEVICE — SYSTEM CLEARIFY VISUALIZATION (10EA/PK)

## (undated) DEVICE — SET EXTENSION WITH 2 PORTS (48EA/CA) ***PART #2C8610 IS A SUBSTITUTE*****

## (undated) DEVICE — GLOVE BIOGEL SZ 7 SURGICAL PF LTX - (50PR/BX 4BX/CA)

## (undated) DEVICE — ELECTRODE DUAL RETURN W/ CORD - (50/PK)

## (undated) DEVICE — DRESSING TRANSPARENT FILM TEGADERM 2.375 X 2.75"  (100EA/BX)"

## (undated) DEVICE — GLOVE BIOGEL SZ 8 SURGICAL PF LTX - (50PR/BX 4BX/CA)

## (undated) DEVICE — SPONGE GAUZESTER. 2X2 4-PL - (2/PK 50PK/BX 30BX/CS)

## (undated) DEVICE — KIT ANESTHESIA W/CIRCUIT & 3/LT BAG W/FILTER (20EA/CA)

## (undated) DEVICE — SUTURE 4-0 MONOCRYL PLUS PS-2 - 27 INCH (36/BX)

## (undated) DEVICE — TUBE NG SALEM SUMP 16FR (50EA/CA)

## (undated) DEVICE — CANNULA W/SEAL 5X100 Z-THRE - ADED KII (12/BX)

## (undated) DEVICE — GOWN SURGEONS X-LARGE - DISP. (30/CA)

## (undated) DEVICE — SET TUBING PNEUMOCLEAR HIGH FLOW SMOKE EVACUATION (10EA/BX)

## (undated) DEVICE — COVER TIP ENDOWRIST HOT SHEAR - (10EA/BX) DA VINCI

## (undated) DEVICE — SENSOR OXIMETER ADULT SPO2 RD SET (20EA/BX)

## (undated) DEVICE — LACTATED RINGERS INJ 1000 ML - (14EA/CA 60CA/PF)

## (undated) DEVICE — DA VINCI INSUFFLATOR TUBE SET - SMOKE EVACUATION (6EA/BX)

## (undated) DEVICE — MASK ANESTHESIA ADULT  - (100/CA)

## (undated) DEVICE — GLOVE SIZE 7.0 SURGEON ACCELERATOR FREE GREEN (50PR/BX 4BX/CA)

## (undated) DEVICE — PROTECTOR ULNA NERVE - (36PR/CA)

## (undated) DEVICE — SUTURE GENERAL

## (undated) DEVICE — COVER LIGHT HANDLE ALC PLUS DISP (18EA/BX)

## (undated) DEVICE — GLOVE BIOGEL PI INDICATOR SZ 6.5 SURGICAL PF LF - (50/BX 4BX/CA)

## (undated) DEVICE — SODIUM CHL IRRIGATION 0.9% 1000ML (12EA/CA)

## (undated) DEVICE — DRAPE ARM BOX OF 20

## (undated) DEVICE — SUTURE 0 VICRYL PLUS UR-6 - 27 INCH (36/BX)

## (undated) DEVICE — PACK LAP CHOLE OR - (2EA/CA)

## (undated) DEVICE — NEPTUNE 4 PORT MANIFOLD - (20/PK)

## (undated) DEVICE — TUBING CLEARLINK DUO-VENT - C-FLO (48EA/CA)

## (undated) DEVICE — TROCAR 5X100 NON BLADED Z-TH - READ KII (6/BX)

## (undated) DEVICE — DRAPESURG STERI-DRAPE LONG - (10/BX 4BX/CA)

## (undated) DEVICE — BAG SPONGE COUNT 10.25 X 32 - BLUE (250/CA)

## (undated) DEVICE — GOWN SURGICAL XX-LARGE - (28EA/CA) SIRUS NON REINFORCED

## (undated) DEVICE — TROCAR Z THREAD11MM OPTICAL - NON BLADED(6/BX)

## (undated) DEVICE — BIPOLAR FORCE DA VINCI 12X'S REISABLE

## (undated) DEVICE — NEEDLE DRIVER MEGA SUTURECUT DA VINCI 15X'S REUSABLE

## (undated) DEVICE — GLOVE BIOGEL PI INDICATOR SZ 7.0 SURGICAL PF LF - (50/BX 4BX/CA)

## (undated) DEVICE — GLOVE SZ 7.5 BIOGEL PI MICRO - PF LF (50PR/BX)

## (undated) DEVICE — BAG RETRIEVAL 10ML (10EA/BX)

## (undated) DEVICE — OBTURATOR BLADELESS STANDARD 8MM (6EA/BX)